# Patient Record
Sex: MALE | HISPANIC OR LATINO | Employment: FULL TIME | URBAN - METROPOLITAN AREA
[De-identification: names, ages, dates, MRNs, and addresses within clinical notes are randomized per-mention and may not be internally consistent; named-entity substitution may affect disease eponyms.]

---

## 2018-01-16 ENCOUNTER — HOSPITAL ENCOUNTER (EMERGENCY)
Facility: CLINIC | Age: 23
Discharge: HOME | End: 2018-01-16
Attending: EMERGENCY MEDICINE | Admitting: EMERGENCY MEDICINE
Payer: COMMERCIAL

## 2018-01-16 VITALS
HEIGHT: 66 IN | OXYGEN SATURATION: 97 % | WEIGHT: 216 LBS | DIASTOLIC BLOOD PRESSURE: 80 MMHG | HEART RATE: 96 BPM | RESPIRATION RATE: 16 BRPM | TEMPERATURE: 98.3 F | SYSTOLIC BLOOD PRESSURE: 140 MMHG | BODY MASS INDEX: 34.72 KG/M2

## 2018-01-16 DIAGNOSIS — H16.001 CORNEAL ULCER OF RIGHT EYE: ICD-10-CM

## 2018-01-16 DIAGNOSIS — Z97.3 WEARS CONTACT LENSES: ICD-10-CM

## 2018-01-16 PROCEDURE — 90471 IMMUNIZATION ADMIN: CPT | Performed by: EMERGENCY MEDICINE

## 2018-01-16 PROCEDURE — 90715 TDAP VACCINE 7 YRS/> IM: CPT | Performed by: EMERGENCY MEDICINE

## 2018-01-16 PROCEDURE — 99284 EMERGENCY DEPT VISIT MOD MDM: CPT | Mod: Z6 | Performed by: EMERGENCY MEDICINE

## 2018-01-16 PROCEDURE — 25000125 ZZHC RX 250: Performed by: EMERGENCY MEDICINE

## 2018-01-16 PROCEDURE — 99283 EMERGENCY DEPT VISIT LOW MDM: CPT | Mod: 25 | Performed by: EMERGENCY MEDICINE

## 2018-01-16 PROCEDURE — 25000128 H RX IP 250 OP 636: Performed by: EMERGENCY MEDICINE

## 2018-01-16 RX ORDER — MOXIFLOXACIN 5 MG/ML
SOLUTION/ DROPS OPHTHALMIC
Qty: 1 BOTTLE | Refills: 0 | Status: SHIPPED | OUTPATIENT
Start: 2018-01-16 | End: 2018-01-17

## 2018-01-16 RX ORDER — HYDROCODONE BITARTRATE AND ACETAMINOPHEN 5; 325 MG/1; MG/1
1-2 TABLET ORAL EVERY 6 HOURS PRN
Qty: 8 TABLET | Refills: 0 | Status: SHIPPED | OUTPATIENT
Start: 2018-01-16 | End: 2022-05-23

## 2018-01-16 RX ORDER — PROPARACAINE HYDROCHLORIDE 5 MG/ML
2 SOLUTION/ DROPS OPHTHALMIC ONCE
Status: COMPLETED | OUTPATIENT
Start: 2018-01-16 | End: 2018-01-16

## 2018-01-16 RX ADMIN — CLOSTRIDIUM TETANI TOXOID ANTIGEN (FORMALDEHYDE INACTIVATED), CORYNEBACTERIUM DIPHTHERIAE TOXOID ANTIGEN (FORMALDEHYDE INACTIVATED), BORDETELLA PERTUSSIS TOXOID ANTIGEN (GLUTARALDEHYDE INACTIVATED), BORDETELLA PERTUSSIS FILAMENTOUS HEMAGGLUTININ ANTIGEN (FORMALDEHYDE INACTIVATED), BORDETELLA PERTUSSIS PERTACTIN ANTIGEN, AND BORDETELLA PERTUSSIS FIMBRIAE 2/3 ANTIGEN 0.5 ML: 5; 2; 2.5; 5; 3; 5 INJECTION, SUSPENSION INTRAMUSCULAR at 18:57

## 2018-01-16 RX ADMIN — FLUORESCEIN SODIUM 1 STRIP: 1 STRIP OPHTHALMIC at 18:33

## 2018-01-16 RX ADMIN — PROPARACAINE HYDROCHLORIDE 2 DROP: 5 SOLUTION/ DROPS OPHTHALMIC at 18:21

## 2018-01-16 ASSESSMENT — ENCOUNTER SYMPTOMS
EYE DISCHARGE: 1
RHINORRHEA: 0
PHOTOPHOBIA: 1
FEVER: 0
SHORTNESS OF BREATH: 0
EYE PAIN: 1
EYE REDNESS: 1
COUGH: 0

## 2018-01-16 ASSESSMENT — VISUAL ACUITY
OS: 20/20
OD: 20/25
OS: 20/20
OD: 20/25

## 2018-01-16 NOTE — ED AVS SNAPSHOT
[facility], Emergency Department    [address]    [place] [address]    Phone:  [phone]    Fax:  [phone]                                       Raza Cuevas   MRN: [id number]    Department:  [facility], Emergency Department   Date of Visit:  1/16/2018           Patient Information     Date Of Birth          1995        Your diagnoses for this visit were:     Corneal ulcer of right eye     Wears contact lenses        You were seen by Jailyn Vogel MD.        Discharge Instructions       You have been seen in the emergency department for a corneal ulcer.  You need to discard your current contact lens pair.  Do not wear contacts until this issue has resolved.    You will be called by the ophthalmology clinic tomorrow morning to make an appointment for sometime tomorrow.  It is very important that you go to that appointment tomorrow.  The eye clinic is located on the ninth floor of the [facility], the [facility] on the [facility] on the [place].    If you do not receive a phone call from them, you should call them at the number below and tell them that you were seen in the ER and were told you must be seen tomorrow by an eye doctor.    You need to use the eyedrops exactly as directed.  Use one drop in the right eye every hour while awake.  We have given you a prescription for Norco which is a little bit of a stronger pain medicine.  Do not drive after taking this medicine for at least 6 hours as it can be a bit sedating.  Do not take any extra Tylenol as it already has Tylenol in it.    Eye Clinic (phone: [phone])           Discharge References/Attachments     CORNEAL ULCER, UNDERSTANDING (ENGLISH)    ULCER, CORNEAL (ENGLISH)    CORNEAL ULCER, TREATMENT FOR (ENGLISH)      24 Hour Appointment Hotline       To make an appointment at any [facility], call [phone] ([phone]). If you don't have a family doctor or  clinic, we will help you find one. [facility] are conveniently located to serve the needs of you and your family.             Review of your medicines      START taking        Dose / Directions Last dose taken    HYDROcodone-acetaminophen 5-325 MG per tablet   Commonly known as:  NORCO   Dose:  1-2 tablet   Quantity:  8 tablet        Take 1-2 tablets by mouth every 6 hours as needed for moderate to severe pain   Refills:  0        moxifloxacin 0.5 % ophthalmic solution   Commonly known as:  VIGAMOX   Quantity:  1 Bottle        Take one drop in R eye q 1 hour while awake   Refills:  0                Prescriptions were sent or printed at these locations (2 Prescriptions)                   Other Prescriptions                Printed at Department/Unit printer (2 of 2)         moxifloxacin (VIGAMOX) 0.5 % ophthalmic solution               HYDROcodone-acetaminophen (NORCO) 5-325 MG per tablet                Orders Needing Specimen Collection     None      Pending Results     No orders found from 1/14/2018 to 1/17/2018.            Pending Culture Results     No orders found from 1/14/2018 to 1/17/2018.            Pending Results Instructions     If you had any lab results that were not finalized at the time of your Discharge, you can call the ED Lab Result RN at [phone]. You will be contacted by this team for any positive Lab results or changes in treatment. The nurses are available 7 days a week from 10A to 6:30P.  You can leave a message 24 hours per day and they will return your call.        Thank you for choosing [facility]       Thank you for choosing [facility] for your care. Our goal is always to provide you with excellent care. Hearing back from our patients is one way we can continue to improve our services. Please take a few minutes to complete the written survey that you may receive in the mail after you visit with us. Thank you!        PulseOnhart Information     [employer] lets you send messages to your  "doctor, view your test results, renew your prescriptions, schedule appointments and more. To sign up, go to [email or web address] . Click on \"Log in\" on the left side of the screen, which will take you to the Welcome page. Then click on \"Sign up Now\" on the right side of the page.     You will be asked to enter the access code listed below, as well as some personal information. Please follow the directions to create your username and password.     Your access code is: [email or web address]  Expires: 2018  7:13 PM     Your access code will  in 90 days. If you need help or a new code, please call your [facility] clinic or [phone].        Care EveryWhere ID     This is your Care EveryWhere ID. This could be used by other organizations to access your [facility] medical records  [id number]        Equal Access to Services     [facility] : Haddriss Olivares, waroxana lopez, eliane soriaalmasami tai, diego jimenez . So [facility] [phone].    ATENCIÓN: Si habla español, tiene a de los santos disposición servicios gratuitos de asistencia lingüística. Llame al [phone].    We comply with applicable federal civil rights laws and [place] laws. We do not discriminate on the basis of race, color, national origin, age, disability, sex, sexual orientation, or gender identity.            After Visit Summary       This is your record. Keep this with you and show to your community pharmacist(s) and doctor(s) at your next visit.                  "

## 2018-01-16 NOTE — ED AVS SNAPSHOT
[facility], Emergency Department    [address]    [facility] [address]    Phone:  [phone]    Fax:  [phone]                                       Raza Cuevas   MRN: [id number]    Department:  [facility], Emergency Department   Date of Visit:  1/16/2018           After Visit Summary Signature Page     I have received my discharge instructions, and my questions have been answered. I have discussed any challenges I see with this plan with the nurse or doctor.    ..........................................................................................................................................  Patient/Patient Representative Signature      ..........................................................................................................................................  Patient Representative Print Name and Relationship to Patient    ..................................................               ................................................  Date                                            Time    ..........................................................................................................................................  Reviewed by Signature/Title    ...................................................              ..............................................  Date                                                            Time

## 2018-01-17 ENCOUNTER — OFFICE VISIT (OUTPATIENT)
Dept: OPHTHALMOLOGY | Facility: CLINIC | Age: 23
End: 2018-01-17
Attending: OPHTHALMOLOGY
Payer: COMMERCIAL

## 2018-01-17 DIAGNOSIS — H16.8 KERATITIS SECONDARY TO CONTACT LENS: Primary | ICD-10-CM

## 2018-01-17 DIAGNOSIS — H18.829 KERATITIS SECONDARY TO CONTACT LENS: Primary | ICD-10-CM

## 2018-01-17 PROCEDURE — G0463 HOSPITAL OUTPT CLINIC VISIT: HCPCS | Mod: ZF

## 2018-01-17 RX ORDER — MOXIFLOXACIN 5 MG/ML
1 SOLUTION/ DROPS OPHTHALMIC 4 TIMES DAILY
Qty: 1 BOTTLE | Refills: 1 | Status: SHIPPED | OUTPATIENT
Start: 2018-01-17 | End: 2022-05-23

## 2018-01-17 ASSESSMENT — VISUAL ACUITY
OD_CC: 20/20
OS_CC: 20/20
METHOD: SNELLEN - LINEAR

## 2018-01-17 ASSESSMENT — REFRACTION_WEARINGRX
OS_SPHERE: -7.00
OS_AXIS: 075
OS_CYLINDER: +2.00
OD_AXIS: 095
OD_SPHERE: -7.25
OD_CYLINDER: +2.00

## 2018-01-17 ASSESSMENT — SLIT LAMP EXAM - LIDS
COMMENTS: NORMAL
COMMENTS: NORMAL

## 2018-01-17 ASSESSMENT — CONF VISUAL FIELD
METHOD: COUNTING FINGERS
OS_NORMAL: 1
OD_NORMAL: 1

## 2018-01-17 ASSESSMENT — TONOMETRY
OS_IOP_MMHG: 18
IOP_METHOD: ICARE
OD_IOP_MMHG: 09

## 2018-01-17 ASSESSMENT — EXTERNAL EXAM - LEFT EYE: OS_EXAM: NORMAL

## 2018-01-17 ASSESSMENT — EXTERNAL EXAM - RIGHT EYE: OD_EXAM: NORMAL

## 2018-01-17 NOTE — NURSING NOTE
Chief Complaints and History of Present Illnesses   Patient presents with     Consult For     corneal ulcer     HPI    Affected eye(s):  Right   Symptoms:        Frequency:  Constant       Do you have eye pain now?:  No      Comments:  RE started hurting Sunday pm  +redness  +sensitive to light has gotten better  wears contacts and has been out of the cls since yesterday  blurred va in the RE  +reynold  Erika Annalisaon COT 2:39 PM January 17, 2018

## 2018-01-17 NOTE — ED PROVIDER NOTES
History     Chief Complaint   Patient presents with     Eye Pain     right eye started hurting Sunday neto; hurt while sleeping; tearing, redness, sensitive to light, feels like something is in his eye; wears contacts; blurred vision just in right eye     The history is provided by the patient.     Raza Cuevas is a 22 year old male who presents to the ED with 2 days of ongoing right eye pain. He states his right eye started to hurt on Sunday evening, 2 days ago and states it hasn't improved since then . He has also noted redness in his eye, blurry vision, clear tearing, photophobia and foreign object sensation in his eye. He does wear contacts and didn't have difficulty removing his contact Sunday evening, but states it was more painful the second time he took out his contacts. He also wears glasses. He otherwise currently denies any issues with his left eye, prior eye surgeries, recent injuries, fever, cough, rhinorrhea, chest pain, or shortness of breath.     The patient wears daily wear contacts, does not wear extended wear contacts.  He reports that he wears them on average 12 hours a day.  Occasionally he will fall asleep with his contacts in place and take about a 1 hour nap.    PAST MEDICAL HISTORY  No past medical history on file.  PAST SURGICAL HISTORY  No past surgical history on file.  FAMILY HISTORY  No family history on file.  SOCIAL HISTORY  Social History   Substance Use Topics     Smoking status: Not on file     Smokeless tobacco: Not on file     Alcohol use Not on file     MEDICATIONS  No current facility-administered medications for this encounter.      Current Outpatient Prescriptions   Medication     moxifloxacin (VIGAMOX) 0.5 % ophthalmic solution     HYDROcodone-acetaminophen (NORCO) 5-325 MG per tablet     ALLERGIES  Allergies   Allergen Reactions     Seasonal Allergies      Sneezing, itchy eyes       I have reviewed the Medications, Allergies, Past Medical and Surgical History, and  "Social History in the Epic system.    Review of Systems   Constitutional: Negative for fever.   HENT: Negative for rhinorrhea.    Eyes: Positive for photophobia (R), pain (right), discharge (clear tears R eye), redness (R) and visual disturbance (blurry vision in R).   Respiratory: Negative for cough and shortness of breath.    Cardiovascular: Negative for chest pain.   All other systems reviewed and are negative.      Physical Exam   BP: 132/76  Pulse: 96  Heart Rate: 87  Temp: 97.7  F (36.5  C)  Resp: 16  Height: 167.6 cm (5' 6\")  Weight: 98 kg (216 lb)  SpO2: 99 %  Visual Acuity-Left: 20/20  Visual Acuity-Right: 20/25      Physical Exam   Constitutional: He appears well-developed and well-nourished.    male, alert, cooperative, some distress.    HENT:   Head: Normocephalic.   Mouth/Throat: Oropharynx is clear and moist.   Eyes:   VA 20/25 R, 20/20 L  Copious amounts of clear teary watery discharge from the right eye    Injected conjunctiva right eye    Pupils equal round reactive to light, extraocular motions intact    Fluorescein stain demonstrates a circular area of uptake outside of the visual axis on the right eye.  It is in the superior and lateral quadrant of the right eye.  There is an obvious epithelial defect which is circular in nature.  This does not appear to be dendritic in nature.  It is very symmetric so rather unlikely to be a corneal abrasion.     Neck: Normal range of motion.   Cardiovascular: Normal rate, regular rhythm, normal heart sounds and intact distal pulses.    No murmur heard.  Pulmonary/Chest: Effort normal and breath sounds normal. No respiratory distress. He has no wheezes. He has no rales.   Abdominal: Soft. He exhibits no distension. There is no tenderness. There is no rebound.   Nursing note and vitals reviewed.      ED Course     ED Course     Procedures   6:21 PM  The patient was seen and examined by Dr. Vogel in Room 7.                Critical Care time:  none      "        Labs Ordered and Resulted from Time of ED Arrival Up to the Time of Departure from the ED - No data to display         Assessments & Plan (with Medical Decision Making)   Patient presents to the emergency department today complaining of eye pain that has been going on for 2 days.  He does wear contact lenses.  He has had persistent tearing of his right eye and significant photosensitivity and redness.  Differential diagnosis could include corneal abrasion, corneal laceration, corneal ulcer, infection such as herpes keratitis, also need to consider conjunctivitis, other ocular etiologies including iritis or other acute ocular emergencies.  On exam the patient has diffuse conjunctival injection on the right with consistent clear tearing.  Fluorescein exam does show a focal area of uptake in the upper lateral quadrant, potentially consistent with an ulcer.  Again he does wear contact lenses.  I did review [place] immunization connection database, he does need to be updated on his tetanus so we will order this.      I have also discussed with ophthalmology resident.     Ophthalmology resident reports that it is okay for the patient to follow-up with ophthalmology tomorrow in the eye clinic.  They will potentially discuss corneal cultures tomorrow but this does not need to be done right now.  It is okay for the patient to take antibiotic eyedrops, I will write a prescription for moxifloxacin.  The patient should take 1 drop in the right eye every 1 hour while awake.  I did advise him to discard his contact lenses.  He should not wear contacts until ophthalmology states it is okay for him to do so after this acute issue has resolved.  I will give him a prescription for a few Norco for discomfort tonight.  I will give him the phone number to the eye clinic on the ninth floor of the [facility].  They should call him tomorrow with a specific time for an eye appointment.  I stressed the importance of following up with  ophthalmology given the potentially serious nature of corneal ulcers.  Patient verbalizes understanding.     I have reviewed the nursing notes.    I have reviewed the findings, diagnosis, plan and need for follow up with the patient.    Discharge Medication List as of 1/16/2018  7:13 PM      START taking these medications    Details   moxifloxacin (VIGAMOX) 0.5 % ophthalmic solution Take one drop in R eye q 1 hour while awake, Disp-1 Bottle, R-0, Local Print      HYDROcodone-acetaminophen (NORCO) 5-325 MG per tablet Take 1-2 tablets by mouth every 6 hours as needed for moderate to severe pain, Disp-8 tablet, R-0, Local Print             Final diagnoses:   Corneal ulcer of right eye   Wears contact lenses     I, Johnathno Baker, am serving as a trained medical scribe to document services personally performed by Jailyn Vogel MD, based on the provider's statements to me.      Jailyn HONG MD, was physically present and have reviewed and verified the accuracy of this note documented by Johnathon Baker.     1/16/2018   [facility] [place], EMERGENCY DEPARTMENT     Jailyn Vogel MD  01/17/18 0000

## 2018-01-17 NOTE — DISCHARGE INSTRUCTIONS
You have been seen in the emergency department for a corneal ulcer.  You need to discard your current contact lens pair.  Do not wear contacts until this issue has resolved.    You will be called by the ophthalmology clinic tomorrow morning to make an appointment for sometime tomorrow.  It is very important that you go to that appointment tomorrow.  The eye clinic is located on the ninth floor of the [facility], the [facility] on the [facility] on the [place].    If you do not receive a phone call from them, you should call them at the number below and tell them that you were seen in the ER and were told you must be seen tomorrow by an eye doctor.    You need to use the eyedrops exactly as directed.  Use one drop in the right eye every hour while awake.  We have given you a prescription for Norco which is a little bit of a stronger pain medicine.  Do not drive after taking this medicine for at least 6 hours as it can be a bit sedating.  Do not take any extra Tylenol as it already has Tylenol in it.    Eye Clinic (phone: [phone])

## 2018-01-17 NOTE — MR AVS SNAPSHOT
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      Your next 10 appointments already scheduled     2018  3:00 PM CST   RETURN GENERAL with Olivia Carrillo MD   Eye Clinic ([facility])    [address]  [address] [facility]  9[address]  [facility] [address]   [phone]              Who to contact     Please call your clinic at [phone] to:    Ask questions about your health    Make or cancel appointments    Discuss your medicines    Learn about your test results    Speak to your doctor   If you have compliments or concerns about an experience at your clinic, or if you wish to file a complaint, please contact [facility] Patient Relations at [phone] or email us at [email or web address]         Additional Information About Your Visit        MyChart Information     Screenmailerhart is an electronic gateway that provides easy, online access to your medical records. With [employer], you can request a clinic appointment, read your test results, renew a prescription or communicate with your care team.     To sign up for [employer]formerly known [employer])t visit the website at [email or web address]   You will be asked to enter the access code listed below, as well as some personal information. Please follow the directions to create your username and password.     Your access code is: [email or web address]  Expires: 2018  7:13 PM     Your access code will  in 90 days. If you need help or a new code, please contact your [facility] Clinic or call  [phone] for assistance.        Care EveryWhere ID     This is your Care EveryWhere ID. This could be used by other organizations to access your [facility] medical records  [id number]         Blood Pressure from Last 3 Encounters:   01/16/18 140/80    Weight from Last 3 Encounters:   01/16/18 98 kg (216 lb)              Today, you had the following     No orders found for display         Today's Medication Changes          These changes are accurate as of: 1/17/18  3:25 PM.  If you have any questions, ask your nurse or doctor.               These medicines have changed or have updated prescriptions.        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   Changed by:  Olivia Carrillo MD        Dose:  1 drop   Place 1 drop into the right eye 4 times daily Take one drop in R eye q 1 hour while awake   Quantity:  1 Bottle   Refills:  1            Where to get your medicines      These medications were sent to [facility] [address] [place], [place] [address] AT [place] [id number], [facility] [address]     Phone:  [phone]     moxifloxacin 0.5 % ophthalmic solution                Primary Care Provider Fax #    Physician No Ref-Primary [phone]       No address on file        Equal Access to Services     BRIE PACHECO AH: Hadii preet haleo Sokristelali, waaxda luqadaha, qaybta kaalmada zaire, diego zuluaga. So [facility] [phone].    ATENCIÓN: Si habla español, tiene a de los santos disposición servicios gratuitos de asistencia lingüística. Bob al [phone].    We comply with applicable federal civil rights laws and [place] laws. We do not discriminate on the basis of race, color, national origin, age, disability, sex, sexual orientation, or gender identity.            Thank  you!     Thank you for choosing EYE CLINIC  for your care. Our goal is always to provide you with excellent care. Hearing back from our patients is one way we can continue to improve our services. Please take a few minutes to complete the written survey that you may receive in the mail after your visit with us. Thank you!             Your Updated Medication List - Protect others around you: Learn how to safely use, store and throw away your medicines at www.disposemymeds.org.          This list is accurate as of: 1/17/18  3:25 PM.  Always use your most recent med list.                   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

## 2018-01-22 NOTE — PROGRESS NOTES
HPI  Raza Fernandez is a 22 year old male who woke a few days ago with right eye pain and redness. He wears soft contact lenses, but denies sleeping in them. They are monthly disposables, and he does wear them longer than a month. He states he has not been wearing them since the pain began. He went to the emergency room last night because of the pain and redness, and was prescribed moxifloxacin. He has been using the drop with a little bit of improvement.    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   Plan: Moxifloxacin every 1-2 hours while awake. Recheck in 1 week.      -----------------------------------------------------------------------------------    Patient disposition:   Return in about 1 week (around 1/24/2018). or sooner as needed.    Teaching statement:  Complete documentation of historical and exam elements from today's encounter can be found in the full encounter summary report (not reduplicated in this progress note). I personally obtained the chief complaint(s) and history of present illness.  I confirmed and edited as necessary the review of systems, past medical/surgical history, family history, social history, and examination findings as documented by others; and I examined the patient myself. I personally reviewed the relevant tests, images, and reports as documented above.     I formulated and edited as necessary the assessment and plan and discussed the findings and management plan with the patient and family.    Olivia Carrillo MD  Comprehensive Ophthalmology & Ocular Pathology  Department of Ophthalmology and Visual Neurosciences  [email or web address]  Pager [phone]

## 2018-01-24 ENCOUNTER — OFFICE VISIT (OUTPATIENT)
Dept: OPHTHALMOLOGY | Facility: CLINIC | Age: 23
End: 2018-01-24
Attending: OPHTHALMOLOGY
Payer: COMMERCIAL

## 2018-01-24 DIAGNOSIS — H18.829 KERATITIS SECONDARY TO CONTACT LENS: Primary | ICD-10-CM

## 2018-01-24 DIAGNOSIS — H16.8 KERATITIS SECONDARY TO CONTACT LENS: Primary | ICD-10-CM

## 2018-01-24 PROCEDURE — G0463 HOSPITAL OUTPT CLINIC VISIT: HCPCS | Mod: ZF

## 2018-01-24 ASSESSMENT — CONF VISUAL FIELD: OS_NORMAL: 1

## 2018-01-24 ASSESSMENT — SLIT LAMP EXAM - LIDS
COMMENTS: NORMAL
COMMENTS: NORMAL

## 2018-01-24 ASSESSMENT — VISUAL ACUITY
METHOD: SNELLEN - LINEAR
OS_CC: 20/20
OD_CC: 20/20
CORRECTION_TYPE: GLASSES

## 2018-01-24 ASSESSMENT — EXTERNAL EXAM - LEFT EYE: OS_EXAM: NORMAL

## 2018-01-24 ASSESSMENT — TONOMETRY
IOP_METHOD: ICARE
OS_IOP_MMHG: 11
OD_IOP_MMHG: 08

## 2018-01-24 ASSESSMENT — REFRACTION_WEARINGRX
OD_CYLINDER: +2.00
OD_SPHERE: -7.25
OD_AXIS: 095
OS_SPHERE: -7.00
OS_CYLINDER: +2.00
OS_AXIS: 075

## 2018-01-24 ASSESSMENT — EXTERNAL EXAM - RIGHT EYE: OD_EXAM: NORMAL

## 2018-01-24 NOTE — MR AVS SNAPSHOT
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      Who to contact     Please call your clinic at [phone] to:    Ask questions about your health    Make or cancel appointments    Discuss your medicines    Learn about your test results    Speak to your doctor   If you have compliments or concerns about an experience at your clinic, or if you wish to file a complaint, please contact [facility] Patient Relations at [phone] or email us at [email or web address]         Additional Information About Your Visit        MyChart Information     [employer] is an electronic gateway that provides easy, online access to your medical records. With [employer], you can request a clinic appointment, read your test results, renew a prescription or communicate with your care team.     To sign up for Be my eyest visit the website at [email or web address]   You will be asked to enter the access code listed below, as well as some personal information. Please follow the directions to create your username and password.     Your access code is: [email or web address]  Expires: 2018  7:13 PM     Your access code will  in 90 days. If you need help or a new code, please contact your [facility] or call [phone] for assistance.        Care EveryWhere ID     This is your Care EveryWhere ID. This could be used by other organizations to access your [facility] medical records  [id number]         Blood Pressure from Last 3 Encounters:   18 140/80    Weight from  Last 3 Encounters:   01/16/18 98 kg (216 lb)              Today, you had the following     No orders found for display       Primary Care Provider Fax #    Physician No Ref-Primary [phone]       No address on file        Equal Access to Services     BRIE TARA : Miguel A preet taylor los Socollin, waaxda luqadaha, qaybta kaalmada aderea, diego mcnultypema zuluaga. So [facility] [phone].    ATENCIÓN: Si habla español, tiene a de los santos disposición servicios gratuitos de asistencia lingüística. Llame al [phone].    We comply with applicable federal civil rights laws and [place] laws. We do not discriminate on the basis of race, color, national origin, age, disability, sex, sexual orientation, or gender identity.            Thank you!     Thank you for choosing [facility]  for your care. Our goal is always to provide you with excellent care. Hearing back from our patients is one way we can continue to improve our services. Please take a few minutes to complete the written survey that you may receive in the mail after your visit with us. Thank you!             Your Updated Medication List - Protect others around you: Learn how to safely use, store and throw away your medicines at www.disposemymeds.org.          This list is accurate as of 1/24/18 11:59 PM.  Always use your most recent med list.                   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

## 2018-01-24 NOTE — NURSING NOTE
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 States that he is doing well.  No c/o VA or comfort BE.  Hillary Muller COT 2:53 PM January 24, 2018

## 2018-01-24 NOTE — PROGRESS NOTES
HPI  Raza Fernandez is a 22 year old male following up for his keratitis secondary to contact lens use. He has been using Vigamox for the past week and reports improvement in his vision and symptoms. Currently on VIgamox twice a day.    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 or sooner as needed.    Teaching statement:  Complete documentation of historical and exam elements from today's encounter can be found in the full encounter summary report (not reduplicated in this progress note). I personally obtained the chief complaint(s) and history of present illness.  I confirmed and edited as necessary the review of systems, past medical/surgical history, family history, social history, and examination findings as documented by others; and I examined the patient myself. I personally reviewed the relevant tests, images, and reports as documented above.     I formulated and edited as necessary the assessment and plan and discussed the findings and management plan with the patient and family.    Olivia Carrillo MD  Comprehensive Ophthalmology & Ocular Pathology  Department of Ophthalmology and Visual Neurosciences  [email or web address]  Pager [phone]

## 2019-01-16 ENCOUNTER — OFFICE VISIT (OUTPATIENT)
Dept: OPTOMETRY | Facility: CLINIC | Age: 24
End: 2019-01-16

## 2019-01-16 DIAGNOSIS — H52.13 MYOPIA OF BOTH EYES: ICD-10-CM

## 2019-01-16 DIAGNOSIS — Z01.01 ENCOUNTER FOR EXAMINATION OF EYES AND VISION WITH ABNORMAL FINDINGS: Primary | ICD-10-CM

## 2019-01-16 DIAGNOSIS — H35.412 LATTICE DEGENERATION OF LEFT RETINA: ICD-10-CM

## 2019-01-16 DIAGNOSIS — H10.45 CHRONIC ALLERGIC CONJUNCTIVITIS: ICD-10-CM

## 2019-01-16 DIAGNOSIS — H52.223 REGULAR ASTIGMATISM OF BOTH EYES: ICD-10-CM

## 2019-01-16 PROCEDURE — 92015 DETERMINE REFRACTIVE STATE: CPT | Performed by: OPTOMETRIST

## 2019-01-16 PROCEDURE — 92004 COMPRE OPH EXAM NEW PT 1/>: CPT | Performed by: OPTOMETRIST

## 2019-01-16 ASSESSMENT — REFRACTION_WEARINGRX
OD_SPHERE: -7.50
OS_CYLINDER: +1.75
OD_AXIS: 093
OD_CYLINDER: +2.50
OS_SPHERE: -6.75
SPECS_TYPE: SVL
OS_AXIS: 083

## 2019-01-16 ASSESSMENT — REFRACTION_MANIFEST
OD_AXIS: 105
OS_CYLINDER: +2.25
OD_SPHERE: -7.00
OS_SPHERE: -7.00
OD_CYLINDER: +2.00
OS_AXIS: 088

## 2019-01-16 ASSESSMENT — CONF VISUAL FIELD
OD_NORMAL: 1
OS_NORMAL: 1

## 2019-01-16 ASSESSMENT — TONOMETRY
OD_IOP_MMHG: 18
OS_IOP_MMHG: 18
IOP_METHOD: APPLANATION

## 2019-01-16 ASSESSMENT — VISUAL ACUITY
OD_CC: 20/20
OS_CC: 20/25
OS_SC: 20/300
OS_CC: 20/40
OD_CC: 20/20 -1
METHOD: SNELLEN - LINEAR
OD_CC+: -1
OS_SC: 20/80 -1
CORRECTION_TYPE: GLASSES
OD_SC: 20/300
OD_SC: 20/120

## 2019-01-16 ASSESSMENT — CUP TO DISC RATIO
OD_RATIO: 0.2
OS_RATIO: 0.2

## 2019-01-16 ASSESSMENT — EXTERNAL EXAM - LEFT EYE: OS_EXAM: NORMAL

## 2019-01-16 ASSESSMENT — SLIT LAMP EXAM - LIDS
COMMENTS: NORMAL
COMMENTS: NORMAL

## 2019-01-16 ASSESSMENT — EXTERNAL EXAM - RIGHT EYE: OD_EXAM: NORMAL

## 2019-01-16 NOTE — PROGRESS NOTES
"Chief Complaint   Patient presents with     Annual Eye Exam      Accompanied by self  Last Eye Exam: 1 year ago  Dilated Previously: Yes    What are you currently using to see?  glasses and contacts-no fitting today       Distance Vision Acuity: Noticed gradual change in both eyes    Near Vision Acuity: Satisfied with vision while reading  unaided    Eye Comfort: dry and itchy due to allergies  Do you use eye drops? : No  Occupation or Hobbies: student    Mallorie Blanchard, Optometric Tech          Medical, surgical and family histories reviewed and updated 1/16/2019.       OBJECTIVE: See Ophthalmology exam    ASSESSMENT:    ICD-10-CM    1. Encounter for examination of eyes and vision with abnormal findings Z01.01    2. Chronic allergic conjunctivitis H10.45    3. 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 Myopia of both eyes H52.13    5. Regular astigmatism of both eyes H52.223       PLAN:     Patient Instructions   Recommend use of OTC allergy eyedrops as needed for allergy symptoms. Use ketotifen fumarate (Alaway or Zaditor) 1 drop 2x daily in each eye.     You have lattice degeneration, which is a thinning of the peripheral retina.  This puts you at a slighter risk of a tear in the retina.  The signs of a retinal detachment can include a large change in \"floaters\", flashes of light or a \"curtain veil\" covering the vision.  If you should notice any of these changes, return to the clinic immediately.     Raza was advised of today's exam findings.  Fill glasses prescription  Allow 2 weeks to adapt to change in glasses  Copy of glasses Rx provided today.  Return in 1-2 years for eye exam, or sooner if needed.    The affects of the dilating drops last for 4- 6 hours.  You will be more sensitive to light and vision will be blurry up close.  Mydriatic sunglasses were given if needed.      Prashanth Ortiz O.D.  [address] [place]  [address]    [phone]       "

## 2019-01-16 NOTE — LETTER
"    1/16/2019         RE: Raza Fernandez  [address] [place] [facility] [address]        Dear Colleague,    Thank you for referring your patient, Raza Fernandez, to the [facility]. Please see a copy of my visit note below.    Chief Complaint   Patient presents with     Annual Eye Exam      Accompanied by self  Last Eye Exam: 1 year ago  Dilated Previously: Yes    What are you currently using to see?  glasses and contacts-no fitting today       Distance Vision Acuity: Noticed gradual change in both eyes    Near Vision Acuity: Satisfied with vision while reading  unaided    Eye Comfort: dry and itchy due to allergies  Do you use eye drops? : No  Occupation or Hobbies: student    Mallorie Blanchard, Optometric Tech          Medical, surgical and family histories reviewed and updated 1/16/2019.       OBJECTIVE: See Ophthalmology exam    ASSESSMENT:    ICD-10-CM    1. Encounter for examination of eyes and vision with abnormal findings Z01.01    2. Chronic allergic conjunctivitis H10.45    3. 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 Myopia of both eyes H52.13    5. Regular astigmatism of both eyes H52.223       PLAN:     Patient Instructions   Recommend use of OTC allergy eyedrops as needed for allergy symptoms. Use ketotifen fumarate (Alaway or Zaditor) 1 drop 2x daily in each eye.     You have lattice degeneration, which is a thinning of the peripheral retina.  This puts you at a slighter risk of a tear in the retina.  The signs of a retinal detachment can include a large change in \"floaters\", flashes of light or a \"curtain veil\" covering the vision.  If you should notice any of these changes, return to the clinic immediately.     Raza was advised of today's exam findings.  Fill glasses prescription  Allow 2 weeks to adapt to change in glasses  Copy of glasses Rx provided today.  Return in 1-2 years for eye exam, or sooner if needed.    The affects of the dilating drops last for 4- " 6 hours.  You will be more sensitive to light and vision will be blurry up close.  Mydriatic sunglasses were given if needed.      Prashanth Ortiz O.D.  [address]  [place]  [address]    [phone]           Again, thank you for allowing me to participate in the care of your patient.        Sincerely,        Prashanth Ortiz, OD

## 2019-01-16 NOTE — PATIENT INSTRUCTIONS
"Recommend use of OTC allergy eyedrops as needed for allergy symptoms. Use ketotifen fumarate (Alaway or Zaditor) 1 drop 2x daily in each eye.     You have lattice degeneration, which is a thinning of the peripheral retina.  This puts you at a slighter risk of a tear in the retina.  The signs of a retinal detachment can include a large change in \"floaters\", flashes of light or a \"curtain veil\" covering the vision.  If you should notice any of these changes, return to the clinic immediately.     Raza was advised of today's exam findings.  Fill glasses prescription  Allow 2 weeks to adapt to change in glasses  Copy of glasses Rx provided today.  Return in 1-2 years for eye exam, or sooner if needed.    The affects of the dilating drops last for 4- 6 hours.  You will be more sensitive to light and vision will be blurry up close.  Mydriatic sunglasses were given if needed.      Prashanth Ortiz O.D.  [address]. [place]  [address]    [phone]    "

## 2019-02-11 ENCOUNTER — APPOINTMENT (OUTPATIENT)
Dept: OPTOMETRY | Facility: CLINIC | Age: 24
End: 2019-02-11
Payer: COMMERCIAL

## 2019-02-11 PROCEDURE — 92340 FIT SPECTACLES MONOFOCAL: CPT | Performed by: STUDENT IN AN ORGANIZED HEALTH CARE EDUCATION/TRAINING PROGRAM

## 2019-11-05 ENCOUNTER — HEALTH MAINTENANCE LETTER (OUTPATIENT)
Age: 24
End: 2019-11-05

## 2020-11-22 ENCOUNTER — HEALTH MAINTENANCE LETTER (OUTPATIENT)
Age: 25
End: 2020-11-22

## 2021-01-11 ENCOUNTER — OFFICE VISIT (OUTPATIENT)
Dept: OPTOMETRY | Facility: CLINIC | Age: 26
End: 2021-01-11
Payer: COMMERCIAL

## 2021-01-11 DIAGNOSIS — Z01.01 ENCOUNTER FOR EXAMINATION OF EYES AND VISION WITH ABNORMAL FINDINGS: Primary | ICD-10-CM

## 2021-01-11 DIAGNOSIS — H52.13 MYOPIA OF BOTH EYES: ICD-10-CM

## 2021-01-11 DIAGNOSIS — H35.412 LATTICE DEGENERATION OF LEFT RETINA: ICD-10-CM

## 2021-01-11 DIAGNOSIS — H52.223 REGULAR ASTIGMATISM OF BOTH EYES: ICD-10-CM

## 2021-01-11 PROCEDURE — 92014 COMPRE OPH EXAM EST PT 1/>: CPT | Performed by: OPTOMETRIST

## 2021-01-11 PROCEDURE — 92015 DETERMINE REFRACTIVE STATE: CPT | Performed by: OPTOMETRIST

## 2021-01-11 ASSESSMENT — REFRACTION_WEARINGRX
OD_CYLINDER: +2.00
SPECS_TYPE: SVL
OD_AXIS: 105
OS_AXIS: 088
OD_SPHERE: -7.00
OS_CYLINDER: +2.25
OS_SPHERE: -7.00

## 2021-01-11 ASSESSMENT — VISUAL ACUITY
CORRECTION_TYPE: GLASSES
METHOD: SNELLEN - LINEAR
OS_CC: 20/25
OS_CC+: -1
OD_CC+: -2
OS_CC: 20/30
OD_CC: 20/40-2
OD_CC: 20/20

## 2021-01-11 ASSESSMENT — REFRACTION_MANIFEST
OS_AXIS: 079
OD_CYLINDER: +1.75
OS_CYLINDER: +2.50
OD_AXIS: 104
OD_SPHERE: -6.75
OS_SPHERE: -7.25

## 2021-01-11 ASSESSMENT — TONOMETRY
OS_IOP_MMHG: 18
OD_IOP_MMHG: 18
IOP_METHOD: APPLANATION

## 2021-01-11 ASSESSMENT — CONF VISUAL FIELD
OS_NORMAL: 1
METHOD: COUNTING FINGERS
OD_NORMAL: 1

## 2021-01-11 ASSESSMENT — CUP TO DISC RATIO
OS_RATIO: 0.2
OD_RATIO: 0.2

## 2021-01-11 ASSESSMENT — SLIT LAMP EXAM - LIDS
COMMENTS: NORMAL
COMMENTS: NORMAL

## 2021-01-11 ASSESSMENT — EXTERNAL EXAM - RIGHT EYE: OD_EXAM: NORMAL

## 2021-01-11 ASSESSMENT — EXTERNAL EXAM - LEFT EYE: OS_EXAM: NORMAL

## 2021-01-11 NOTE — PROGRESS NOTES
"Chief Complaint   Patient presents with     Annual Eye Exam     CE, Declined Fitting          Last Eye Exam: 1/16/2019  Dilated Previously: Yes    What are you currently using to see?  Glasses, not wearing contacts any longer        Distance Vision Acuity: Satisfied with vision, no changes that he is aware of, glasses seem to work     Near Vision Acuity: Satisfied with vision while reading and using computer with glasses    Eye Comfort: good  Do you use eye drops? 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       OBJECTIVE: See Ophthalmology exam    ASSESSMENT:    ICD-10-CM    1. Encounter for examination of eyes and vision with abnormal findings  Z01.01    2. Lattice degeneration of left retina  H35.412    3. Myopia of both eyes  H52.13    4. Regular astigmatism of both eyes  H52.223       PLAN:     Patient Instructions   Continue use of OTC ketotifen fumarate eyedrops for allergy symptoms. Use 1 drop twice daily in each eye as needed.     Updated glasses prescription provided today. Optional to fill, minimal change.     You have lattice degeneration, which is a thinning of the peripheral retina.  This puts you at a slightly higher risk of developing a tear in the retina.  The signs of a retinal detachment can include a large change in \"floaters\", flashes of light or a \"curtain veil\" covering the vision.  If you should notice any of these changes, return to the clinic immediately.     Return in 1-2 years for comprehensive eye exam, or sooner if needed.     The effects of the dilating drops last for 4- 6 hours.  You will be more sensitive to light and vision will be blurry up close.  Mydriatic sunglasses were given if needed.    Prashanth Ortiz, OD  [address] [place]  [address]    [phone]       "

## 2021-01-11 NOTE — LETTER
"    1/11/2021         RE: Raza Fernandez  [address] [facility] [address]        Dear Colleague,    Thank you for referring your patient, Raza Fernandez, to the [facility]. Please see a copy of my visit note below.    Chief Complaint   Patient presents with     Annual Eye Exam     CE, Declined Fitting          Last Eye Exam: 1/16/2019  Dilated Previously: Yes    What are you currently using to see?  Glasses, not wearing contacts any longer        Distance Vision Acuity: Satisfied with vision, no changes that he is aware of, glasses seem to work     Near Vision Acuity: Satisfied with vision while reading and using computer with glasses    Eye Comfort: good  Do you use eye drops? 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       OBJECTIVE: See Ophthalmology exam    ASSESSMENT:    ICD-10-CM    1. Encounter for examination of eyes and vision with abnormal findings  Z01.01    2. Lattice degeneration of left retina  H35.412    3. Myopia of both eyes  H52.13    4. Regular astigmatism of both eyes  H52.223       PLAN:     Patient Instructions   Continue use of OTC ketotifen fumarate eyedrops for allergy symptoms. Use 1 drop twice daily in each eye as needed.     Updated glasses prescription provided today. Optional to fill, minimal change.     You have lattice degeneration, which is a thinning of the peripheral retina.  This puts you at a slightly higher risk of developing a tear in the retina.  The signs of a retinal detachment can include a large change in \"floaters\", flashes of light or a \"curtain veil\" covering the vision.  If you should notice any of these changes, return to the clinic immediately.     Return in 1-2 years for comprehensive eye exam, or sooner if needed.     The effects of the dilating drops last for 4- 6 " hours.  You will be more sensitive to light and vision will be blurry up close.  Mydriatic sunglasses were given if needed.    Prashanth Ortiz OD  [address]. [place]  [address] [phone]           Again, thank you for allowing me to participate in the care of your patient.        Sincerely,        Prashanth Ortiz OD

## 2021-01-11 NOTE — PATIENT INSTRUCTIONS
"Continue use of OTC ketotifen fumarate eyedrops for allergy symptoms. Use 1 drop twice daily in each eye as needed.     Updated glasses prescription provided today. Optional to fill, minimal change.     You have lattice degeneration, which is a thinning of the peripheral retina.  This puts you at a slightly higher risk of developing a tear in the retina.  The signs of a retinal detachment can include a large change in \"floaters\", flashes of light or a \"curtain veil\" covering the vision.  If you should notice any of these changes, return to the clinic immediately.     Return in 1-2 years for comprehensive eye exam, or sooner if needed.     The effects of the dilating drops last for 4- 6 hours.  You will be more sensitive to light and vision will be blurry up close.  Mydriatic sunglasses were given if needed.    Prashanth Ortiz, OD  [address] [place]  [address]    [phone]    "

## 2021-09-19 ENCOUNTER — HEALTH MAINTENANCE LETTER (OUTPATIENT)
Age: 26
End: 2021-09-19

## 2021-11-12 ENCOUNTER — APPOINTMENT (OUTPATIENT)
Dept: OPTOMETRY | Facility: CLINIC | Age: 26
End: 2021-11-12
Payer: COMMERCIAL

## 2021-11-12 PROCEDURE — V2100 LENS SPHER SINGLE PLANO 4.00: HCPCS | Mod: RT | Performed by: OPTOMETRIST

## 2021-11-12 PROCEDURE — V2020 VISION SVCS FRAMES PURCHASES: HCPCS | Performed by: OPTOMETRIST

## 2021-11-26 ENCOUNTER — APPOINTMENT (OUTPATIENT)
Dept: OPTOMETRY | Facility: CLINIC | Age: 26
End: 2021-11-26
Payer: COMMERCIAL

## 2021-11-26 PROCEDURE — 92340 FIT SPECTACLES MONOFOCAL: CPT | Performed by: OPTOMETRIST

## 2022-01-09 ENCOUNTER — HEALTH MAINTENANCE LETTER (OUTPATIENT)
Age: 27
End: 2022-01-09

## 2022-05-07 ENCOUNTER — HOSPITAL ENCOUNTER (EMERGENCY)
Facility: CLINIC | Age: 27
Discharge: HOME | End: 2022-05-08
Attending: EMERGENCY MEDICINE | Admitting: EMERGENCY MEDICINE
Payer: COMMERCIAL

## 2022-05-07 DIAGNOSIS — R06.2 WHEEZING: ICD-10-CM

## 2022-05-07 PROCEDURE — 99283 EMERGENCY DEPT VISIT LOW MDM: CPT | Mod: 25

## 2022-05-07 PROCEDURE — 93010 ELECTROCARDIOGRAM REPORT: CPT | Performed by: EMERGENCY MEDICINE

## 2022-05-07 PROCEDURE — 250N000013 HC RX MED GY IP 250 OP 250 PS 637: Performed by: EMERGENCY MEDICINE

## 2022-05-07 PROCEDURE — 94640 AIRWAY INHALATION TREATMENT: CPT

## 2022-05-07 PROCEDURE — 93005 ELECTROCARDIOGRAM TRACING: CPT

## 2022-05-07 PROCEDURE — 99284 EMERGENCY DEPT VISIT MOD MDM: CPT | Mod: 25 | Performed by: EMERGENCY MEDICINE

## 2022-05-07 RX ORDER — ALBUTEROL SULFATE 90 UG/1
6 AEROSOL, METERED RESPIRATORY (INHALATION) ONCE
Status: COMPLETED | OUTPATIENT
Start: 2022-05-07 | End: 2022-05-07

## 2022-05-07 RX ADMIN — ALBUTEROL SULFATE 6 PUFF: 90 AEROSOL, METERED RESPIRATORY (INHALATION) at 22:55

## 2022-05-08 VITALS
WEIGHT: 270 LBS | OXYGEN SATURATION: 95 % | DIASTOLIC BLOOD PRESSURE: 93 MMHG | HEIGHT: 68 IN | TEMPERATURE: 98 F | BODY MASS INDEX: 40.92 KG/M2 | RESPIRATION RATE: 17 BRPM | SYSTOLIC BLOOD PRESSURE: 141 MMHG | HEART RATE: 112 BPM

## 2022-05-08 LAB
ATRIAL RATE - MUSE: 117 BPM
DIASTOLIC BLOOD PRESSURE - MUSE: NORMAL MMHG
INTERPRETATION ECG - MUSE: NORMAL
P AXIS - MUSE: 68 DEGREES
PR INTERVAL - MUSE: 152 MS
QRS DURATION - MUSE: 94 MS
QT - MUSE: 328 MS
QTC - MUSE: 457 MS
R AXIS - MUSE: 73 DEGREES
SYSTOLIC BLOOD PRESSURE - MUSE: NORMAL MMHG
T AXIS - MUSE: 46 DEGREES
VENTRICULAR RATE- MUSE: 117 BPM

## 2022-05-08 ASSESSMENT — ENCOUNTER SYMPTOMS
SHORTNESS OF BREATH: 1
ABDOMINAL PAIN: 0
COLOR CHANGE: 0
HEADACHES: 0
CONFUSION: 0
NECK STIFFNESS: 0
FEVER: 0
ARTHRALGIAS: 0
COUGH: 1

## 2022-05-08 NOTE — ED NOTES
Pt states inhaler from triage worked very well.  Pt is having an easier time breathing, No hx of asthma in the past, however, started to exercise more vigorously recently and noticed breathing difficulties.

## 2022-05-08 NOTE — ED TRIAGE NOTES
"For the past 2 days has been having SOB, worse when laying down and any activities. Denies being Dx respiratory issues. Just recently started exercising  \" maybe I am overdoing it. I also have seasonal allergies not sure if this is the cause\"     Triage Assessment     Row Name 05/07/22 5051       Triage Assessment (Adult)    Airway WDL WDL       Respiratory WDL    Respiratory WDL all    Rhythm/Pattern, Respiratory pattern regular;unlabored;depth regular    Expansion/Accessory Muscles/Retractions expansion symmetric;no retractions;no use of accessory muscles    Nailbeds no discoloration    Mucous Membranes intact    Cough Frequency no cough       Skin Circulation/Temperature WDL    Skin Circulation/Temperature WDL WDL       Cardiac WDL    Cardiac WDL WDL       Peripheral/Neurovascular WDL    Peripheral Neurovascular WDL WDL       Cognitive/Neuro/Behavioral WDL    Cognitive/Neuro/Behavioral WDL WDL              "

## 2022-05-08 NOTE — DISCHARGE INSTRUCTIONS
Please use 2 puffs of the inhaler we have given for you as needed for shortness of breath.  Follow-up with your primary care provider.  They may want to do pulmonary function testing to determine if you actually have asthma.  Return to the emergency department as needed for any new or worsening symptoms.

## 2022-05-08 NOTE — ED PROVIDER NOTES
"ED Provider Note  [facility] [place]      History     Chief Complaint   Patient presents with     Shortness of Breath     For the past 2 days has been having SOB, worse when laying down and any activities. Denies being Dx respiratory issues. Just recently started exercising  \" maybe I am overdoing it. I also have seasonal allergies not sure if this is the cause\"     The history is provided by the patient and medical records.     Raza Fernandez is a 26 year old male presenting to the ED with shortness of breath. Patient reports that he did some intensive cardio workouts yesterday for the first time in a while. Since then he has been increasingly short of breath. He endorses some cough. No fever. He has no history of asthma. The albuterol inhaler given here in the ED provided significant relief. He does have a PCP he can f/u with.     Past Medical History  Obesity   Corneal Ulcer of right eye   Fracture clavicle, closed     Past Surgical History:   Procedure Laterality Date     NO HISTORY OF SURGERY       HYDROcodone-acetaminophen (NORCO) 5-325 MG per tablet  moxifloxacin (VIGAMOX) 0.5 % ophthalmic solution      Allergies   Allergen Reactions     Seasonal Allergies      Sneezing, itchy eyes     Family History  Family History   Problem Relation Age of Onset     Diabetes No family hx of      Glaucoma No family hx of      Macular Degeneration No family hx of      Social History   Social History     Tobacco Use     Smoking status: Never Smoker     Smokeless tobacco: Never Used      Past medical history, past surgical history, medications, allergies, family history, and social history were reviewed with the patient. No additional pertinent items.       Review of Systems   Constitutional: Negative for fever.   HENT: Negative for congestion.    Respiratory: Positive for cough and shortness of breath.    Cardiovascular: Negative for chest pain.   Gastrointestinal: Negative for abdominal pain. " "  Musculoskeletal: Negative for arthralgias and neck stiffness.   Skin: Negative for color change.   Neurological: Negative for headaches.   Psychiatric/Behavioral: Negative for confusion.   All other systems reviewed and are negative.    A complete review of systems was performed with pertinent positives and negatives noted in the HPI, and all other systems negative.    Physical Exam   BP: 132/80  Pulse: (!) 122  Temp: 98.6  F (37  C)  Resp: 16  Height: 172.7 cm (5' 8\")  Weight: 122.5 kg (270 lb)  SpO2: 95 %  Physical Exam  Vitals and nursing note reviewed.   Constitutional:       General: He is not in acute distress.     Appearance: He is well-developed.   HENT:      Head: Normocephalic.      Right Ear: External ear normal.      Left Ear: External ear normal.      Nose: Nose normal.   Eyes:      Extraocular Movements: Extraocular movements intact.      Conjunctiva/sclera: Conjunctivae normal.   Pulmonary:      Effort: Pulmonary effort is normal. No respiratory distress.      Breath sounds: Wheezing present.   Abdominal:      General: Abdomen is flat. There is no distension.   Musculoskeletal:         General: No deformity. Normal range of motion.      Cervical back: Normal range of motion and neck supple.   Skin:     General: Skin is warm and dry.   Neurological:      Mental Status: He is alert. Mental status is at baseline.      Comments: Oriented   Psychiatric:         Mood and Affect: Mood normal.         Behavior: Behavior normal.         ED Course      Procedures         Medications   albuterol (PROVENTIL HFA/VENTOLIN HFA) inhaler (6 puffs Inhalation Given 5/7/22 2449)          EKG Interpretation:      Interpreted by Juan Flores DO  Time reviewed:0000   Symptoms at time of EKG: Dyspnea  Rhythm: normal sinus   Rate: 117  Axis: NORMAL  Ectopy: none  Conduction: normal  ST Segments/ T Waves: No ST-T wave changes  Q Waves: none  Comparison to prior: No old EKG available    Clinical Impression: Sinus " tachycardia, otherwise normal EKG             Assessments & Plan (with Medical Decision Making)   26-year-old male presents to us with a chief complaint of wheezing after initiating exercise program.  He did have some wheezing on exam.  This improved with albuterol.  Patient stated he felt much better.  He does not have a previous diagnosis of his asthma.  No fevers to suggest COVID viral illnesses or pneumonia.  Previous records were reviewed.  We will discharge her with the albuterol inhaler and follow-up with primary care.  We will recommend further treatment.  EKG was interpreted and was unremarkable.  I have reviewed the nursing notes. I have reviewed the findings, diagnosis, plan and need for follow up with the patient.    Discharge Medication List as of 5/8/2022 12:38 AM          Final diagnoses:   Wheezing   IRobyn, am serving as a trained medical scribe to document services personally performed by Juan Flores DO, based on the provider's statements to me.     IJuan DO, was physically present and have reviewed and verified the accuracy of this note documented by Robyn Goel.      --  Juan Flores DO  [facility] EMERGENCY DEPARTMENT  5/7/2022     Juan Flores DO  05/08/22 0223

## 2022-05-23 ENCOUNTER — OFFICE VISIT (OUTPATIENT)
Dept: FAMILY MEDICINE | Facility: CLINIC | Age: 27
End: 2022-05-23
Payer: COMMERCIAL

## 2022-05-23 VITALS
DIASTOLIC BLOOD PRESSURE: 76 MMHG | HEART RATE: 102 BPM | SYSTOLIC BLOOD PRESSURE: 122 MMHG | WEIGHT: 270.02 LBS | BODY MASS INDEX: 41.06 KG/M2 | TEMPERATURE: 97.8 F | OXYGEN SATURATION: 96 %

## 2022-05-23 DIAGNOSIS — Z11.4 SCREENING FOR HIV (HUMAN IMMUNODEFICIENCY VIRUS): ICD-10-CM

## 2022-05-23 DIAGNOSIS — Z00.00 ROUTINE GENERAL MEDICAL EXAMINATION AT A HEALTH CARE FACILITY: Primary | ICD-10-CM

## 2022-05-23 DIAGNOSIS — E66.01 MORBID OBESITY (H): ICD-10-CM

## 2022-05-23 DIAGNOSIS — Z11.59 NEED FOR HEPATITIS C SCREENING TEST: ICD-10-CM

## 2022-05-23 DIAGNOSIS — J45.990 EXERCISE-INDUCED ASTHMA: ICD-10-CM

## 2022-05-23 LAB
ERYTHROCYTE [DISTWIDTH] IN BLOOD BY AUTOMATED COUNT: 14.1 % (ref 10–15)
HBA1C MFR BLD: 5.4 % (ref 0–5.6)
HCT VFR BLD AUTO: 44.6 % (ref 40–53)
HGB BLD-MCNC: 14.6 G/DL (ref 13.3–17.7)
MCH RBC QN AUTO: 28.7 PG (ref 26.5–33)
MCHC RBC AUTO-ENTMCNC: 32.7 G/DL (ref 31.5–36.5)
MCV RBC AUTO: 88 FL (ref 78–100)
PLATELET # BLD AUTO: 333 10E3/UL (ref 150–450)
RBC # BLD AUTO: 5.09 10E6/UL (ref 4.4–5.9)
WBC # BLD AUTO: 9.7 10E3/UL (ref 4–11)

## 2022-05-23 PROCEDURE — 83036 HEMOGLOBIN GLYCOSYLATED A1C: CPT | Performed by: PHYSICIAN ASSISTANT

## 2022-05-23 PROCEDURE — 86803 HEPATITIS C AB TEST: CPT | Performed by: PHYSICIAN ASSISTANT

## 2022-05-23 PROCEDURE — 36415 COLL VENOUS BLD VENIPUNCTURE: CPT | Performed by: PHYSICIAN ASSISTANT

## 2022-05-23 PROCEDURE — 99213 OFFICE O/P EST LOW 20 MIN: CPT | Mod: 25 | Performed by: PHYSICIAN ASSISTANT

## 2022-05-23 PROCEDURE — 80061 LIPID PANEL: CPT | Performed by: PHYSICIAN ASSISTANT

## 2022-05-23 PROCEDURE — 99385 PREV VISIT NEW AGE 18-39: CPT | Performed by: PHYSICIAN ASSISTANT

## 2022-05-23 PROCEDURE — 87389 HIV-1 AG W/HIV-1&-2 AB AG IA: CPT | Performed by: PHYSICIAN ASSISTANT

## 2022-05-23 PROCEDURE — 80053 COMPREHEN METABOLIC PANEL: CPT | Performed by: PHYSICIAN ASSISTANT

## 2022-05-23 PROCEDURE — 84443 ASSAY THYROID STIM HORMONE: CPT | Performed by: PHYSICIAN ASSISTANT

## 2022-05-23 PROCEDURE — 85027 COMPLETE CBC AUTOMATED: CPT | Performed by: PHYSICIAN ASSISTANT

## 2022-05-23 RX ORDER — ALBUTEROL SULFATE 90 UG/1
2 AEROSOL, METERED RESPIRATORY (INHALATION) EVERY 6 HOURS
COMMUNITY
End: 2023-08-02

## 2022-05-23 ASSESSMENT — ENCOUNTER SYMPTOMS
SHORTNESS OF BREATH: 1
MYALGIAS: 0
FREQUENCY: 0
NERVOUS/ANXIOUS: 0
CHILLS: 0
HEMATOCHEZIA: 0
DIZZINESS: 0
WEAKNESS: 0
CONSTIPATION: 0
ARTHRALGIAS: 0
ABDOMINAL PAIN: 0
COUGH: 0
FEVER: 0
PALPITATIONS: 0
HEMATURIA: 0
JOINT SWELLING: 0
HEADACHES: 0
DYSURIA: 0
DIARRHEA: 0
SORE THROAT: 0
NAUSEA: 0
PARESTHESIAS: 0
EYE PAIN: 0
HEARTBURN: 1

## 2022-05-23 ASSESSMENT — PAIN SCALES - GENERAL: PAINLEVEL: NO PAIN (0)

## 2022-05-23 NOTE — PROGRESS NOTES
SUBJECTIVE:   CC: Raza Fernandez is an 26 year old male who presents for preventative health visit.     Patient has been advised of split billing requirements and indicates understanding: Yes  Healthy Habits:     Getting at least 3 servings of Calcium per day:  NO    Bi-annual eye exam:  Yes    Dental care twice a year:  Yes    Sleep apnea or symptoms of sleep apnea:  Excessive snoring    Diet:  Regular (no restrictions)    Frequency of exercise:  2-3 days/week    Duration of exercise:  45-60 minutes    Taking medications regularly:  Yes    Medication side effects:  None    PHQ-2 Total Score: 0    Additional concerns today:  No      Was playing soccer outside, had shortness of breath afterwards (multiple hours later). Was having some wheezing, so went to the ER. Hasn't needed to use the inhaler since that time. No wheezing issues when he was younger. He does have seasonal allergies. No eczema.     No family history of asthma.      . Works from home.     Not .     Grandmother maternal has high cholesterol. No heart disease. No diabetes.                   Today's PHQ-2 Score:   PHQ-2 ( 1999 Pfizer) 5/23/2022   Q1: Little interest or pleasure in doing things 0   Q2: Feeling down, depressed or hopeless 0   PHQ-2 Score 0   Q1: Little interest or pleasure in doing things Not at all   Q2: Feeling down, depressed or hopeless Not at all   PHQ-2 Score 0       Abuse: Current or Past(Physical, Sexual or Emotional)- No  Do you feel safe in your environment? Yes    Have you ever done Advance Care Planning? (For example, a Health Directive, POLST, or a discussion with a medical provider or your loved ones about your wishes): Yes, patient states has an Advance Care Planning document and will bring a copy to the clinic.    Social History     Tobacco Use     Smoking status: Never Smoker     Smokeless tobacco: Never Used   Substance Use Topics     Alcohol use: Not on file         Alcohol Use 5/23/2022    Prescreen: >3 drinks/day or >7 drinks/week? No   Prescreen: >3 drinks/day or >7 drinks/week? -   No flowsheet data found.    Last PSA: No results found for: PSA    Reviewed orders with patient. Reviewed health maintenance and updated orders accordingly - Yes  BP Readings from Last 3 Encounters:   05/23/22 122/76   05/08/22 (!) 141/93   01/16/18 140/80    Wt Readings from Last 3 Encounters:   05/23/22 122.5 kg (270 lb 0.4 oz)   05/07/22 122.5 kg (270 lb)   01/16/18 98 kg (216 lb)                    Reviewed and updated as needed this visit by clinical staff   Tobacco  Allergies  Meds  Problems  Med Hx  Surg Hx  Fam Hx            Reviewed and updated as needed this visit by Provider   Tobacco  Allergies  Meds  Problems  Med Hx  Surg Hx  Fam Hx               Review of Systems   Constitutional: Negative for chills and fever.   HENT: Negative for congestion, ear pain, hearing loss and sore throat.    Eyes: Negative for pain and visual disturbance.   Respiratory: Positive for shortness of breath. Negative for cough.    Cardiovascular: Negative for chest pain, palpitations and peripheral edema.   Gastrointestinal: Positive for heartburn. Negative for abdominal pain, constipation, diarrhea, hematochezia and nausea.   Genitourinary: Negative for dysuria, frequency, genital sores, hematuria, impotence, penile discharge and urgency.   Musculoskeletal: Negative for arthralgias, joint swelling and myalgias.   Skin: Negative for rash.   Neurological: Negative for dizziness, weakness, headaches and paresthesias.   Psychiatric/Behavioral: Negative for mood changes. The patient is not nervous/anxious.          OBJECTIVE:   /76 (BP Location: Right arm, Patient Position: Chair, Cuff Size: Adult Large)   Pulse 102   Temp 97.8  F (36.6  C) (Oral)   Wt 122.5 kg (270 lb 0.4 oz)   SpO2 96%   BMI 41.06 kg/m      Physical Exam  GENERAL: healthy, alert and no distress  EYES: Eyes grossly normal to inspection, PERRL  and conjunctivae and sclerae normal  HENT: ear canals and TM's normal, nose and mouth without ulcers or lesions  NECK: no adenopathy, no asymmetry, masses, or scars and thyroid normal to palpation  RESP: lungs clear to auscultation - no rales, rhonchi or wheezes  CV: regular rate and rhythm, normal S1 S2, no S3 or S4, no murmur, click or rub, no peripheral edema and peripheral pulses strong  ABDOMEN: soft, nontender, no hepatosplenomegaly, no masses and bowel sounds normal  MS: no gross musculoskeletal defects noted, no edema  SKIN: no suspicious lesions or rashes  NEURO: Normal strength and tone, mentation intact and speech normal  PSYCH: mentation appears normal, affect normal/bright    Diagnostic Test Results:  Labs reviewed in Epic    ASSESSMENT/PLAN:   1. Routine general medical examination at a health care facility  Well adult.   - HIV Antigen Antibody Combo; Future  - Hepatitis C Screen Reflex to HCV RNA Quant and Genotype; Future  - Lipid panel reflex to direct LDL Fasting; Future  - Hemoglobin A1c; Future  - CBC with platelets; Future  - Comprehensive metabolic panel (BMP + Alb, Alk Phos, ALT, AST, Total. Bili, TP); Future  - TSH with free T4 reflex; Future    2. Exercise-induced asthma  Likely exercise induced asthma. Has had one episode - was seen in the ED for this. Has never had asthma issues in the past. May be related to seasonal allergies as well. Discussed testing, treatment. Will monitor symptoms for the next 6 months and return for follow up. If having recurrent episodes, did discuss being seen sooner and having PFTs completed. Will hold off on testing for now as he has had only one episode at this time.     3. Morbid obesity (H)  Discussed diet and exercise.    4. Screening for HIV (human immunodeficiency virus)  Screen.  - HIV Antigen Antibody Combo; Future  - HIV Antigen Antibody Combo    5. Need for hepatitis C screening test  Screen.  - Hepatitis C Screen Reflex to HCV RNA Quant and Genotype;  "Future  - Hepatitis C Screen Reflex to HCV RNA Quant and Genotype      Patient has been advised of split billing requirements and indicates understanding: Yes    COUNSELING:   Special attention given to:        Regular exercise       Healthy diet/nutrition       Safe sex practices/STD prevention    Estimated body mass index is 41.06 kg/m  as calculated from the following:    Height as of 5/7/22: 1.727 m (5' 8\").    Weight as of this encounter: 122.5 kg (270 lb 0.4 oz).     Weight management plan: Discussed healthy diet and exercise guidelines    He reports that he has never smoked. He has never used smokeless tobacco.      Counseling Resources:  ATP IV Guidelines  Pooled Cohorts Equation Calculator  FRAX Risk Assessment  ICSI Preventive Guidelines  Dietary Guidelines for Americans, 2010  USDA's MyPlate  ASA Prophylaxis  Lung CA Screening    BALTA Braun [facility]  "

## 2022-05-24 LAB
ALBUMIN SERPL-MCNC: 3.8 G/DL (ref 3.4–5)
ALP SERPL-CCNC: 142 U/L (ref 40–150)
ALT SERPL W P-5'-P-CCNC: 76 U/L (ref 0–70)
ANION GAP SERPL CALCULATED.3IONS-SCNC: 8 MMOL/L (ref 3–14)
AST SERPL W P-5'-P-CCNC: 20 U/L (ref 0–45)
BILIRUB SERPL-MCNC: 0.5 MG/DL (ref 0.2–1.3)
BUN SERPL-MCNC: 9 MG/DL (ref 7–30)
CALCIUM SERPL-MCNC: 9.2 MG/DL (ref 8.5–10.1)
CHLORIDE BLD-SCNC: 106 MMOL/L (ref 94–109)
CHOLEST SERPL-MCNC: 170 MG/DL
CO2 SERPL-SCNC: 25 MMOL/L (ref 20–32)
CREAT SERPL-MCNC: 0.63 MG/DL (ref 0.66–1.25)
FASTING STATUS PATIENT QL REPORTED: NO
GFR SERPL CREATININE-BSD FRML MDRD: >90 ML/MIN/1.73M2
GLUCOSE BLD-MCNC: 96 MG/DL (ref 70–99)
HCV AB SERPL QL IA: NONREACTIVE
HDLC SERPL-MCNC: 42 MG/DL
HIV 1+2 AB+HIV1 P24 AG SERPL QL IA: NONREACTIVE
LDLC SERPL CALC-MCNC: 94 MG/DL
NONHDLC SERPL-MCNC: 128 MG/DL
POTASSIUM BLD-SCNC: 3.7 MMOL/L (ref 3.4–5.3)
PROT SERPL-MCNC: 7.5 G/DL (ref 6.8–8.8)
SODIUM SERPL-SCNC: 139 MMOL/L (ref 133–144)
TRIGL SERPL-MCNC: 168 MG/DL
TSH SERPL DL<=0.005 MIU/L-ACNC: 1.64 MU/L (ref 0.4–4)

## 2022-11-21 ENCOUNTER — HEALTH MAINTENANCE LETTER (OUTPATIENT)
Age: 27
End: 2022-11-21

## 2022-12-29 ENCOUNTER — PATIENT OUTREACH (OUTPATIENT)
Dept: FAMILY MEDICINE | Facility: CLINIC | Age: 27
End: 2022-12-29
Payer: COMMERCIAL

## 2022-12-29 NOTE — TELEPHONE ENCOUNTER
Patient Quality Outreach    Patient is due for the following:   Asthma  -  ACT needed and AAP    Next Steps:   Schedule a office visit for asthma    Type of outreach:    Sent [employer] message.      Questions for provider review:    None     Jimmy Davidson MA

## 2023-01-19 NOTE — TELEPHONE ENCOUNTER
Left message for pt to call to schedule appt or schedule through [employer].  Jimmy Davidson MA on 1/19/2023 at 3:26 PM

## 2023-04-23 ENCOUNTER — PATIENT OUTREACH (OUTPATIENT)
Dept: CARE COORDINATION | Facility: CLINIC | Age: 28
End: 2023-04-23
Payer: COMMERCIAL

## 2023-05-07 ENCOUNTER — PATIENT OUTREACH (OUTPATIENT)
Dept: CARE COORDINATION | Facility: CLINIC | Age: 28
End: 2023-05-07
Payer: COMMERCIAL

## 2023-07-08 ENCOUNTER — HEALTH MAINTENANCE LETTER (OUTPATIENT)
Age: 28
End: 2023-07-08

## 2023-08-02 ENCOUNTER — MYC REFILL (OUTPATIENT)
Dept: FAMILY MEDICINE | Facility: CLINIC | Age: 28
End: 2023-08-02
Payer: COMMERCIAL

## 2023-08-02 DIAGNOSIS — J45.990 EXERCISE-INDUCED ASTHMA: Primary | ICD-10-CM

## 2023-08-03 RX ORDER — ALBUTEROL SULFATE 90 UG/1
2 AEROSOL, METERED RESPIRATORY (INHALATION) EVERY 6 HOURS
Qty: 18 G | Refills: 1 | Status: SHIPPED | OUTPATIENT
Start: 2023-08-03

## 2024-05-01 ENCOUNTER — OFFICE VISIT (OUTPATIENT)
Dept: OPTOMETRY | Facility: CLINIC | Age: 29
End: 2024-05-01
Payer: COMMERCIAL

## 2024-05-01 DIAGNOSIS — H52.223 REGULAR ASTIGMATISM OF BOTH EYES: ICD-10-CM

## 2024-05-01 DIAGNOSIS — H52.13 HIGH MYOPIA, BOTH EYES: ICD-10-CM

## 2024-05-01 DIAGNOSIS — Z01.01 ENCOUNTER FOR EXAMINATION OF EYES AND VISION WITH ABNORMAL FINDINGS: Primary | ICD-10-CM

## 2024-05-01 DIAGNOSIS — H35.412 LATTICE DEGENERATION OF LEFT RETINA: ICD-10-CM

## 2024-05-01 PROCEDURE — 92015 DETERMINE REFRACTIVE STATE: CPT | Performed by: OPTOMETRIST

## 2024-05-01 PROCEDURE — 92004 COMPRE OPH EXAM NEW PT 1/>: CPT | Performed by: OPTOMETRIST

## 2024-05-01 ASSESSMENT — REFRACTION_MANIFEST
OS_SPHERE: -7.00
OS_AXIS: 085
OD_CYLINDER: +2.00
OS_CYLINDER: +2.50
OD_AXIS: 098
OD_SPHERE: -6.50

## 2024-05-01 ASSESSMENT — CONF VISUAL FIELD
METHOD: COUNTING FINGERS
OD_SUPERIOR_TEMPORAL_RESTRICTION: 0
OD_INFERIOR_TEMPORAL_RESTRICTION: 0
OS_SUPERIOR_NASAL_RESTRICTION: 0
OD_SUPERIOR_NASAL_RESTRICTION: 0
OD_INFERIOR_NASAL_RESTRICTION: 0
OS_INFERIOR_TEMPORAL_RESTRICTION: 0
OS_SUPERIOR_TEMPORAL_RESTRICTION: 0
OS_NORMAL: 1
OS_INFERIOR_NASAL_RESTRICTION: 0
OD_NORMAL: 1

## 2024-05-01 ASSESSMENT — TONOMETRY
OD_IOP_MMHG: 16
IOP_METHOD: APPLANATION
OS_IOP_MMHG: 16

## 2024-05-01 ASSESSMENT — VISUAL ACUITY
METHOD: SNELLEN - LINEAR
OS_SC: 20/500
OD_SC: 20/500
OD_CC: 20/20-1
OD_CC: 20/25
OD_CC+: -1
OS_CC: 20/20
OD_SC: 20/60+1
OS_SC: 20/40-2
OS_CC+: -2
OS_CC: 20/20
CORRECTION_TYPE: GLASSES

## 2024-05-01 ASSESSMENT — REFRACTION_WEARINGRX
OS_AXIS: 079
OD_AXIS: 104
OD_CYLINDER: +1.75
OS_CYLINDER: +2.50
OD_SPHERE: -6.75
OS_SPHERE: -7.25
SPECS_TYPE: SVL

## 2024-05-01 ASSESSMENT — EXTERNAL EXAM - LEFT EYE: OS_EXAM: NORMAL

## 2024-05-01 ASSESSMENT — EXTERNAL EXAM - RIGHT EYE: OD_EXAM: NORMAL

## 2024-05-01 ASSESSMENT — SLIT LAMP EXAM - LIDS
COMMENTS: NORMAL
COMMENTS: NORMAL

## 2024-05-01 ASSESSMENT — CUP TO DISC RATIO
OS_RATIO: 0.25
OD_RATIO: 0.2

## 2024-05-01 NOTE — PROGRESS NOTES
Chief Complaint   Patient presents with    Diabetic Eye Exam        Chief Complaint(s) and History of Present Illness(es)       Diabetic Eye Exam              Diabetes Type: controlled with diet (Pre-diabetic )    Duration: 2 years    Blood Sugars: Doesn't check                    Lab Results   Component Value Date    A1C 5.4 05/23/2022       -Interested in Lasik - would like to discuss referral today     Previous contact lens wearer? Yes - wears monthly lens (unknown brand)   Comfort of contact lenses : Good   Satisfied with current lenses: Yes - does not want to update CL Rx at this appointment - last evaluated 1 yr [facility] at [facility]    Last Eye Exam: 1/11/2021  Dilated Previously: Yes, side effects of dilation explained today    What are you currently using to see?  Glasses and contacts - wears glasses most of the time    Distance Vision Acuity: Satisfied with vision    Near Vision Acuity: Satisfied with vision while reading and using computer with glasses and contacts     Eye Comfort: good overall - some allergies   Do you use eye drops? 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       OBJECTIVE: See Ophthalmology exam    ASSESSMENT:    ICD-10-CM    1. 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 High myopia, both eyes  H52.13       4. Regular astigmatism of both eyes  H52.223           PLAN:    Raza Fernandez aware  eye exam results will be sent to Geraldine Shirley  Patient Instructions   Updated glasses prescription provided today.     LASIK options in the area:  1) [facility]  2) [facility]  3) [facility]  4) [facility]    You have lattice degeneration, which is a thinning of the  "peripheral retina.  This puts you at a slightly higher risk of developing a tear in the retina.  The signs of a retinal detachment can include a large change in \"floaters\", flashes of light or a \"curtain veil\" covering the vision.  If you should notice any of these changes, return to the clinic immediately.     Return in 1 year for a comprehensive eye exam, or sooner if needed.      The effects of the dilating drops last for 4- 6 hours.  You will be more sensitive to light and vision will be blurry up close.  Mydriatic sunglasses were given if needed.     Prashanth Ortiz, OD  [address] [place]  [phone]         "

## 2024-05-01 NOTE — PATIENT INSTRUCTIONS
"Updated glasses prescription provided today.     LASIK options in the area:  1) [facility]  2) [facility]  3) [facility]  4) [facility]    You have lattice degeneration, which is a thinning of the peripheral retina.  This puts you at a slightly higher risk of developing a tear in the retina.  The signs of a retinal detachment can include a large change in \"floaters\", flashes of light or a \"curtain veil\" covering the vision.  If you should notice any of these changes, return to the clinic immediately.     Return in 1 year for a comprehensive eye exam, or sooner if needed.      The effects of the dilating drops last for 4- 6 hours.  You will be more sensitive to light and vision will be blurry up close.  Mydriatic sunglasses were given if needed.     Prashanth Ortiz, OD  [address] [place]  [address]    [phone]   "

## 2024-05-01 NOTE — LETTER
5/1/2024         RE: Raza Geoffrey Fernandez  [address] [facility] [address]        Dear Colleague,    Thank you for referring your patient, Raza Fernandez, to the [facility]. Please see a copy of my visit note below.    Chief Complaint   Patient presents with     Diabetic Eye Exam        Chief Complaint(s) and History of Present Illness(es)       Diabetic Eye Exam              Diabetes Type: controlled with diet (Pre-diabetic )    Duration: 2 years    Blood Sugars: Doesn't check                    Lab Results   Component Value Date    A1C 5.4 05/23/2022       -Interested in Lasik - would like to discuss referral today     Previous contact lens wearer? Yes - wears monthly lens (unknown brand)   Comfort of contact lenses : Good   Satisfied with current lenses: Yes - does not want to update CL Rx at this appointment - last evaluated 1 yr ago at [facility]    Last Eye Exam: 1/11/2021  Dilated Previously: Yes, side effects of dilation explained today    What are you currently using to see?  Glasses and contacts - wears glasses most of the time    Distance Vision Acuity: Satisfied with vision    Near Vision Acuity: Satisfied with vision while reading and using computer with glasses and contacts     Eye Comfort: good overall - some allergies   Do you use eye drops? 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       OBJECTIVE: See Ophthalmology exam    ASSESSMENT:    ICD-10-CM    1. 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 High myopia, both eyes  H52.13       4. Regular astigmatism of both eyes  H52.223           PLAN:    Raza Fernandez aware  eye exam results will be sent to Geraldine Shirley  "M.  Patient Instructions   Updated glasses prescription provided today.     LASIK options in the area:  1) [facility]  2) [facility]  3) [facility]  4) [facility]    You have lattice degeneration, which is a thinning of the peripheral retina.  This puts you at a slightly higher risk of developing a tear in the retina.  The signs of a retinal detachment can include a large change in \"floaters\", flashes of light or a \"curtain veil\" covering the vision.  If you should notice any of these changes, return to the clinic immediately.     Return in 1 year for a comprehensive eye exam, or sooner if needed.      The effects of the dilating drops last for 4- 6 hours.  You will be more sensitive to light and vision will be blurry up close.  Mydriatic sunglasses were given if needed.     Prashanth Ortiz, ZACH  [address] [place]  [address]    [phone]           Again, thank you for allowing me to participate in the care of your patient.        Sincerely,        Prashanth Ortiz, OD  "

## 2024-08-31 ENCOUNTER — HEALTH MAINTENANCE LETTER (OUTPATIENT)
Age: 29
End: 2024-08-31

## 2024-10-10 ENCOUNTER — MYC MEDICAL ADVICE (OUTPATIENT)
Dept: FAMILY MEDICINE | Facility: CLINIC | Age: 29
End: 2024-10-10
Payer: COMMERCIAL

## 2024-10-10 DIAGNOSIS — J45.990 EXERCISE-INDUCED ASTHMA: ICD-10-CM

## 2024-10-11 RX ORDER — ALBUTEROL SULFATE 90 UG/1
2 INHALANT RESPIRATORY (INHALATION) EVERY 6 HOURS
Qty: 18 G | Refills: 0 | Status: SHIPPED | OUTPATIENT
Start: 2024-10-11 | End: 2024-11-12

## 2024-10-11 NOTE — TELEPHONE ENCOUNTER
Called pt and lvm. Pt has physical appointment schedule with pcp. No further action is needed.    Natacha GUERRA MA

## 2024-11-12 ENCOUNTER — OFFICE VISIT (OUTPATIENT)
Dept: FAMILY MEDICINE | Facility: CLINIC | Age: 29
End: 2024-11-12
Payer: COMMERCIAL

## 2024-11-12 VITALS
SYSTOLIC BLOOD PRESSURE: 125 MMHG | OXYGEN SATURATION: 96 % | TEMPERATURE: 98.1 F | WEIGHT: 280.2 LBS | RESPIRATION RATE: 16 BRPM | HEART RATE: 99 BPM | BODY MASS INDEX: 43.98 KG/M2 | DIASTOLIC BLOOD PRESSURE: 79 MMHG | HEIGHT: 67 IN

## 2024-11-12 DIAGNOSIS — J45.40 MODERATE PERSISTENT ASTHMA WITHOUT COMPLICATION: ICD-10-CM

## 2024-11-12 DIAGNOSIS — J45.990 EXERCISE-INDUCED ASTHMA: ICD-10-CM

## 2024-11-12 DIAGNOSIS — Z00.00 ROUTINE GENERAL MEDICAL EXAMINATION AT A HEALTH CARE FACILITY: Primary | ICD-10-CM

## 2024-11-12 DIAGNOSIS — E66.01 MORBID OBESITY (H): ICD-10-CM

## 2024-11-12 LAB
EST. AVERAGE GLUCOSE BLD GHB EST-MCNC: 114 MG/DL
HBA1C MFR BLD: 5.6 % (ref 0–5.6)

## 2024-11-12 PROCEDURE — 80048 BASIC METABOLIC PNL TOTAL CA: CPT | Performed by: PHYSICIAN ASSISTANT

## 2024-11-12 PROCEDURE — 90471 IMMUNIZATION ADMIN: CPT | Performed by: PHYSICIAN ASSISTANT

## 2024-11-12 PROCEDURE — 83036 HEMOGLOBIN GLYCOSYLATED A1C: CPT | Performed by: PHYSICIAN ASSISTANT

## 2024-11-12 PROCEDURE — 99395 PREV VISIT EST AGE 18-39: CPT | Mod: 25 | Performed by: PHYSICIAN ASSISTANT

## 2024-11-12 PROCEDURE — 91320 SARSCV2 VAC 30MCG TRS-SUC IM: CPT | Performed by: PHYSICIAN ASSISTANT

## 2024-11-12 PROCEDURE — 80061 LIPID PANEL: CPT | Performed by: PHYSICIAN ASSISTANT

## 2024-11-12 PROCEDURE — 99214 OFFICE O/P EST MOD 30 MIN: CPT | Mod: 25 | Performed by: PHYSICIAN ASSISTANT

## 2024-11-12 PROCEDURE — 90480 ADMN SARSCOV2 VAC 1/ONLY CMP: CPT | Performed by: PHYSICIAN ASSISTANT

## 2024-11-12 PROCEDURE — 36415 COLL VENOUS BLD VENIPUNCTURE: CPT | Performed by: PHYSICIAN ASSISTANT

## 2024-11-12 PROCEDURE — 90656 IIV3 VACC NO PRSV 0.5 ML IM: CPT | Performed by: PHYSICIAN ASSISTANT

## 2024-11-12 RX ORDER — ALBUTEROL SULFATE 90 UG/1
2 INHALANT RESPIRATORY (INHALATION) EVERY 6 HOURS
Qty: 18 G | Refills: 0 | Status: SHIPPED | OUTPATIENT
Start: 2024-11-12

## 2024-11-12 RX ORDER — FLUTICASONE PROPIONATE AND SALMETEROL 100; 50 UG/1; UG/1
1 POWDER RESPIRATORY (INHALATION) 2 TIMES DAILY
Qty: 60 EACH | Refills: 3 | Status: SHIPPED | OUTPATIENT
Start: 2024-11-12

## 2024-11-12 SDOH — HEALTH STABILITY: PHYSICAL HEALTH: ON AVERAGE, HOW MANY DAYS PER WEEK DO YOU ENGAGE IN MODERATE TO STRENUOUS EXERCISE (LIKE A BRISK WALK)?: 3 DAYS

## 2024-11-12 SDOH — HEALTH STABILITY: PHYSICAL HEALTH: ON AVERAGE, HOW MANY MINUTES DO YOU ENGAGE IN EXERCISE AT THIS LEVEL?: 30 MIN

## 2024-11-12 ASSESSMENT — SOCIAL DETERMINANTS OF HEALTH (SDOH)
HOW OFTEN DO YOU GET TOGETHER WITH FRIENDS OR RELATIVES?: TWICE A WEEK
HOW OFTEN DO YOU GET TOGETHER WITH FRIENDS OR RELATIVES?: TWICE A WEEK

## 2024-11-12 ASSESSMENT — ASTHMA QUESTIONNAIRES
QUESTION_5 LAST FOUR WEEKS HOW WOULD YOU RATE YOUR ASTHMA CONTROL: SOMEWHAT CONTROLLED
ACT_TOTALSCORE: 16
QUESTION_3 LAST FOUR WEEKS HOW OFTEN DID YOUR ASTHMA SYMPTOMS (WHEEZING, COUGHING, SHORTNESS OF BREATH, CHEST TIGHTNESS OR PAIN) WAKE YOU UP AT NIGHT OR EARLIER THAN USUAL IN THE MORNING: ONCE OR TWICE
QUESTION_2 LAST FOUR WEEKS HOW OFTEN HAVE YOU HAD SHORTNESS OF BREATH: THREE TO SIX TIMES A WEEK
QUESTION_1 LAST FOUR WEEKS HOW MUCH OF THE TIME DID YOUR ASTHMA KEEP YOU FROM GETTING AS MUCH DONE AT WORK, SCHOOL OR AT HOME: A LITTLE OF THE TIME
QUESTION_4 LAST FOUR WEEKS HOW OFTEN HAVE YOU USED YOUR RESCUE INHALER OR NEBULIZER MEDICATION (SUCH AS ALBUTEROL): ONE OR TWO TIMES PER DAY
ACT_TOTALSCORE: 16
EMERGENCY_ROOM_LAST_YEAR_TOTAL: ONE

## 2024-11-12 NOTE — PROGRESS NOTES
"Preventive Care Visit  [facility] MARA Shirley PA-C, Family Medicine  Nov 12, 2024      Assessment & Plan     Routine general medical examination at a health care facility  Well adult.     Exercise-induced asthma  Refilled.   - albuterol (PROAIR HFA/PROVENTIL HFA/VENTOLIN HFA) 108 (90 Base) MCG/ACT inhaler; Inhale 2 puffs into the lungs every 6 hours.    Moderate persistent asthma without complication  Discussed - not at goal Suggest a daily inhaler. He is agreeable. I discussed with the patient risks and benefits of the new medication prescribed including potential side effects.  The patient had opportunity to ask questions and is comfortable with and interested in medications as prescribed.   - fluticasone-salmeterol (WIXELA INHUB) 100-50 MCG/ACT inhaler; Inhale 1 puff into the lungs 2 times daily.    Morbid obesity (H)  Encouraged healthy diet, exercise. Discussed GLP1 medications. Together decided to try diet and exercise for 6 months - then further discuss medications.   - Lipid panel reflex to direct LDL Fasting; Future  - Hemoglobin A1c; Future  - Basic metabolic panel  (Ca, Cl, CO2, Creat, Gluc, K, Na, BUN); Future  - Lipid panel reflex to direct LDL Fasting  - Hemoglobin A1c  - Basic metabolic panel  (Ca, Cl, CO2, Creat, Gluc, K, Na, BUN)            BMI  Estimated body mass index is 44.34 kg/m  as calculated from the following:    Height as of this encounter: 1.693 m (5' 6.65\").    Weight as of this encounter: 127.1 kg (280 lb 3.2 oz).   Weight management plan: Discussed healthy diet and exercise guidelines    Counseling  Appropriate preventive services were addressed with this patient via screening, questionnaire, or discussion as appropriate for fall prevention, nutrition, physical activity, Tobacco-use cessation, social engagement, weight loss and cognition.  Checklist reviewing preventive services available has been given to the patient.  Reviewed patient's diet, addressing " concerns and/or questions.   He is at risk for lack of exercise and has been provided with information to increase physical activity for the benefit of his well-being.   He is at risk for psychosocial distress and has been provided with information to reduce risk.           Cheryl Person is a 29 year old, presenting for the following:  Physical and weight management (Discuss options for weight loss)        11/12/2024     3:50 PM   Additional Questions   Roomed by An V.         11/12/2024     3:50 PM   Patient Reported Additional Medications   Patient reports taking the following new medications none          HPI    Was in the UC for SOB 11/8/24. Took last dose today. Symptoms have resolved.     Weight loss-  Feels asthma limits activity a bit. Knows diet isn't great.     Asthma-  Thinks allergies (mom has dogs, cats).             Health Care Directive  Patient does not have a Health Care Directive: Discussed advance care planning with patient; information given to patient to review.      11/12/2024   General Health   How would you rate your overall physical health? (!) FAIR   Feel stress (tense, anxious, or unable to sleep) Only a little      (!) STRESS CONCERN      11/12/2024   Nutrition   Three or more servings of calcium each day? (!) I DON'T KNOW   Diet: Regular (no restrictions)   How many servings of fruit and vegetables per day? (!) 0-1   How many sweetened beverages each day? (!) 2            11/12/2024   Exercise   Days per week of moderate/strenous exercise 3 days   Average minutes spent exercising at this level 30 min            11/12/2024   Social Factors   Frequency of gathering with friends or relatives Twice a week   Worry food won't last until get money to buy more No   Food not last or not have enough money for food? No   Do you have housing? (Housing is defined as stable permanent housing and does not include staying ouside in a car, in a tent, in an abandoned building, in an overnight  "shelter, or couch-surfing.) Yes   Are you worried about losing your housing? No   Lack of transportation? No   Unable to get utilities (heat,electricity)? No            11/12/2024   Dental   Dentist two times every year? Yes            11/12/2024   TB Screening   Were you born outside of the [place]? No            Today's PHQ-2 Score:       11/12/2024     3:41 PM   PHQ-2 ( 1999 Pfizer)   Q1: Little interest or pleasure in doing things 0    Q2: Feeling down, depressed or hopeless 0    PHQ-2 Score 0    Q1: Little interest or pleasure in doing things Not at all   Q2: Feeling down, depressed or hopeless Not at all   PHQ-2 Score 0       Patient-reported           11/12/2024   Substance Use   Alcohol more than 3/day or more than 7/wk No   Do you use any other substances recreationally? (!) CANNABIS PRODUCTS        Social History     Tobacco Use    Smoking status: Never    Smokeless tobacco: Never   Vaping Use    Vaping status: Never Used   Substance Use Topics    Alcohol use: Yes    Drug use: Never           11/12/2024   STI Screening   New sexual partner(s) since last STI/HIV test? No            11/12/2024   Contraception/Family Planning   Questions about contraception or family planning No           Reviewed and updated as needed this visit by Provider                          Review of Systems  Constitutional, neuro, ENT, endocrine, pulmonary, cardiac, gastrointestinal, genitourinary, musculoskeletal, integument and psychiatric systems are negative, except as otherwise noted.     Objective    Exam  /79   Pulse 99   Temp 98.1  F (36.7  C) (Temporal)   Resp 16   Ht 1.693 m (5' 6.65\")   Wt 127.1 kg (280 lb 3.2 oz)   SpO2 96%   BMI 44.34 kg/m     Estimated body mass index is 44.34 kg/m  as calculated from the following:    Height as of this encounter: 1.693 m (5' 6.65\").    Weight as of this encounter: 127.1 kg (280 lb 3.2 oz).    Physical Exam  GENERAL: alert and no distress  EYES: Eyes grossly normal to " inspection, PERRL and conjunctivae and sclerae normal  HENT: ear canals and TM's normal, nose and mouth without ulcers or lesions  NECK: no adenopathy, no asymmetry, masses, or scars  RESP: lungs clear to auscultation - no rales, rhonchi or wheezes  CV: regular rate and rhythm, normal S1 S2, no S3 or S4, no murmur, click or rub, no peripheral edema  ABDOMEN: soft, nontender, no hepatosplenomegaly, no masses and bowel sounds normal  MS: no gross musculoskeletal defects noted, no edema  SKIN: no suspicious lesions or rashes  NEURO: Normal strength and tone, mentation intact and speech normal  PSYCH: mentation appears normal, affect normal/bright        Signed Electronically by: Geraldine Shirley PA-C

## 2024-11-12 NOTE — PATIENT INSTRUCTIONS
Patient Education   Preventive Care Advice   This is general advice given by our system to help you stay healthy. However, your care team may have specific advice just for you. Please talk to your care team about your preventive care needs.  Nutrition  Eat 5 or more servings of fruits and vegetables each day.  Try wheat bread, brown rice and whole grain pasta (instead of white bread, rice, and pasta).  Get enough calcium and vitamin D. Check the label on foods and aim for 100% of the RDA (recommended daily allowance).  Lifestyle  Exercise at least 150 minutes each week  (30 minutes a day, 5 days a week).  Do muscle strengthening activities 2 days a week. These help control your weight and prevent disease.  No smoking.  Wear sunscreen to prevent skin cancer.  Have a dental exam and cleaning every 6 months.  Yearly exams  See your health care team every year to talk about:  Any changes in your health.  Any medicines your care team has prescribed.  Preventive care, family planning, and ways to prevent chronic diseases.  Shots (vaccines)   HPV shots (up to age 26), if you've never had them before.  Hepatitis B shots (up to age 59), if you've never had them before.  COVID-19 shot: Get this shot when it's due.  Flu shot: Get a flu shot every year.  Tetanus shot: Get a tetanus shot every 10 years.  Pneumococcal, hepatitis A, and RSV shots: Ask your care team if you need these based on your risk.  Shingles shot (for age 50 and up)  General health tests  Diabetes screening:  Starting at age 35, Get screened for diabetes at least every 3 years.  If you are younger than age 35, ask your care team if you should be screened for diabetes.  Cholesterol test: At age 39, start having a cholesterol test every 5 years, or more often if advised.  Bone density scan (DEXA): At age 50, ask your care team if you should have this scan for osteoporosis (brittle bones).  Hepatitis C: Get tested at least once in your life.  STIs (sexually  transmitted infections)  Before age 24: Ask your care team if you should be screened for STIs.  After age 24: Get screened for STIs if you're at risk. You are at risk for STIs (including HIV) if:  You are sexually active with more than one person.  You don't use condoms every time.  You or a partner was diagnosed with a sexually transmitted infection.  If you are at risk for HIV, ask about PrEP medicine to prevent HIV.  Get tested for HIV at least once in your life, whether you are at risk for HIV or not.  Cancer screening tests  Cervical cancer screening: If you have a cervix, begin getting regular cervical cancer screening tests starting at age 21.  Breast cancer scan (mammogram): If you've ever had breasts, begin having regular mammograms starting at age 40. This is a scan to check for breast cancer.  Colon cancer screening: It is important to start screening for colon cancer at age 45.  Have a colonoscopy test every 10 years (or more often if you're at risk) Or, ask your provider about stool tests like a FIT test every year or Cologuard test every 3 years.  To learn more about your testing options, visit:   .  For help making a decision, visit:   https://bit.ly/va75504.  Prostate cancer screening test: If you have a prostate, ask your care team if a prostate cancer screening test (PSA) at age 55 is right for you.  Lung cancer screening: If you are a current or former smoker ages 50 to 80, ask your care team if ongoing lung cancer screenings are right for you.  For informational purposes only. Not to replace the advice of your health care provider. Copyright   2023 [facility]. All rights reserved. Clinically reviewed by the [facility] Transitions Program. [employer] [id number] - REV 01/24.  Substance Use Disorder: Care Instructions  Overview     You can improve your life and health by stopping your use of alcohol or drugs. When you don't drink or use drugs, you may feel and sleep better. You may  get along better with your family, friends, and coworkers. There are medicines and programs that can help with substance use disorder.  How can you care for yourself at home?  Here are some ways to help you stay sober and prevent relapse.  If you have been given medicine to help keep you sober or reduce your cravings, be sure to take it exactly as prescribed.  Talk to your doctor about programs that can help you stop using drugs or drinking alcohol.  Do not keep alcohol or drugs in your home.  Plan ahead. Think about what you'll say if other people ask you to drink or use drugs. Try not to spend time with people who drink or use drugs.  Use the time and money spent on drinking or drugs to do something that's important to you.  Preventing a relapse  Have a plan to deal with relapse. Learn to recognize changes in your thinking that lead you to drink or use drugs. Get help before you start to drink or use drugs again.  Try to stay away from situations, friends, or places that may lead you to drink or use drugs.  If you feel the need to drink alcohol or use drugs again, seek help right away. Call a trusted friend or family member. Some people get support from organizations such as Narcotics Anonymous or [employer] or from treatment facilities.  If you relapse, get help as soon as you can. Some people make a plan with another person that outlines what they want that person to do for them if they relapse. The plan usually includes how to handle the relapse and who to notify in case of relapse.  Don't give up. Remember that a relapse doesn't mean that you have failed. Use the experience to learn the triggers that lead you to drink or use drugs. Then quit again. Recovery is a lifelong process. Many people have several relapses before they are able to quit for good.  Follow-up care is a key part of your treatment and safety. Be sure to make and go to all appointments, and call your doctor if you are having problems. It's  "also a good idea to know your test results and keep a list of the medicines you take.  When should you call for help?   Call 911  anytime you think you may need emergency care. For example, call if you or someone else:    Has overdosed or has withdrawal signs. Be sure to tell the emergency workers that you are or someone else is using or trying to quit using drugs. Overdose or withdrawal signs may include:  Losing consciousness.  Seizure.  Seeing or hearing things that aren't there (hallucinations).     Is thinking or talking about suicide or harming others.   Where to get help 24 hours a day, 7 days a week   If you or someone you know talks about suicide, self-harm, a mental health crisis, a substance use crisis, or any other kind of emotional distress, get help right away. You can:    Call the Suicide and Crisis Lifeline at 988.     Call 9-138-677-TALK (1-291.506.6113).     Text HOME to 195366 to access the Crisis Text Line.   Consider saving these numbers in your phone.  Go to [facility] [employer] [facility] for more information or to chat online.  Call your doctor now or seek immediate medical care if:    You are having withdrawal symptoms. These may include nausea or vomiting, sweating, shakiness, and anxiety.   Watch closely for changes in your health, and be sure to contact your doctor if:    You have a relapse.     You need more help or support to stop.   Where can you learn more?  Go to https://www.[employer].net/patiented  Enter H573 in the search box to learn more about \"Substance Use Disorder: Care Instructions.\"  Current as of: November 15, 2023  Content Version: 14.2 2024 [facility] [employer].   Care instructions adapted under license by your healthcare professional. If you have questions about a medical condition or this instruction, always ask your healthcare professional. [employer] disclaims any warranty or liability for your use of this information.       "

## 2024-11-14 LAB
ANION GAP SERPL CALCULATED.3IONS-SCNC: 14 MMOL/L (ref 7–15)
BUN SERPL-MCNC: 10.6 MG/DL (ref 6–20)
CALCIUM SERPL-MCNC: 9.3 MG/DL (ref 8.8–10.4)
CHLORIDE SERPL-SCNC: 105 MMOL/L (ref 98–107)
CHOLEST SERPL-MCNC: 206 MG/DL
CREAT SERPL-MCNC: 0.74 MG/DL (ref 0.67–1.17)
EGFRCR SERPLBLD CKD-EPI 2021: >90 ML/MIN/1.73M2
FASTING STATUS PATIENT QL REPORTED: NO
FASTING STATUS PATIENT QL REPORTED: NO
GLUCOSE SERPL-MCNC: 106 MG/DL (ref 70–99)
HCO3 SERPL-SCNC: 21 MMOL/L (ref 22–29)
HDLC SERPL-MCNC: 43 MG/DL
LDLC SERPL CALC-MCNC: 138 MG/DL
NONHDLC SERPL-MCNC: 163 MG/DL
POTASSIUM SERPL-SCNC: 4.5 MMOL/L (ref 3.4–5.3)
SODIUM SERPL-SCNC: 140 MMOL/L (ref 135–145)
TRIGL SERPL-MCNC: 124 MG/DL

## 2025-03-05 ENCOUNTER — MYC REFILL (OUTPATIENT)
Dept: FAMILY MEDICINE | Facility: CLINIC | Age: 30
End: 2025-03-05
Payer: COMMERCIAL

## 2025-03-05 DIAGNOSIS — J45.990 EXERCISE-INDUCED ASTHMA: ICD-10-CM

## 2025-03-06 RX ORDER — ALBUTEROL SULFATE 90 UG/1
2 INHALANT RESPIRATORY (INHALATION) EVERY 6 HOURS
Qty: 18 G | Refills: 0 | Status: SHIPPED | OUTPATIENT
Start: 2025-03-06

## 2025-03-11 ENCOUNTER — MYC MEDICAL ADVICE (OUTPATIENT)
Dept: FAMILY MEDICINE | Facility: CLINIC | Age: 30
End: 2025-03-11
Payer: COMMERCIAL

## 2025-03-11 ASSESSMENT — ASTHMA QUESTIONNAIRES
QUESTION_1 LAST FOUR WEEKS HOW MUCH OF THE TIME DID YOUR ASTHMA KEEP YOU FROM GETTING AS MUCH DONE AT WORK, SCHOOL OR AT HOME: NONE OF THE TIME
ACT_TOTALSCORE: 21
EMERGENCY_ROOM_LAST_YEAR_TOTAL: ONE
QUESTION_3 LAST FOUR WEEKS HOW OFTEN DID YOUR ASTHMA SYMPTOMS (WHEEZING, COUGHING, SHORTNESS OF BREATH, CHEST TIGHTNESS OR PAIN) WAKE YOU UP AT NIGHT OR EARLIER THAN USUAL IN THE MORNING: ONCE OR TWICE
QUESTION_5 LAST FOUR WEEKS HOW WOULD YOU RATE YOUR ASTHMA CONTROL: WELL CONTROLLED
ACT_TOTALSCORE: 21
QUESTION_2 LAST FOUR WEEKS HOW OFTEN HAVE YOU HAD SHORTNESS OF BREATH: ONCE OR TWICE A WEEK
QUESTION_4 LAST FOUR WEEKS HOW OFTEN HAVE YOU USED YOUR RESCUE INHALER OR NEBULIZER MEDICATION (SUCH AS ALBUTEROL): ONCE A WEEK OR LESS

## 2025-04-01 DIAGNOSIS — J45.990 EXERCISE-INDUCED ASTHMA: ICD-10-CM

## 2025-04-01 RX ORDER — ALBUTEROL SULFATE 90 UG/1
2 INHALANT RESPIRATORY (INHALATION) EVERY 6 HOURS
Qty: 6.7 G | Refills: 0 | Status: SHIPPED | OUTPATIENT
Start: 2025-04-01